# Patient Record
Sex: MALE | Race: WHITE | NOT HISPANIC OR LATINO | Employment: FULL TIME | URBAN - METROPOLITAN AREA
[De-identification: names, ages, dates, MRNs, and addresses within clinical notes are randomized per-mention and may not be internally consistent; named-entity substitution may affect disease eponyms.]

---

## 2020-06-09 ENCOUNTER — OFFICE VISIT (OUTPATIENT)
Dept: INTERNAL MEDICINE | Facility: CLINIC | Age: 30
End: 2020-06-09
Payer: COMMERCIAL

## 2020-06-09 VITALS
HEIGHT: 67 IN | WEIGHT: 183 LBS | SYSTOLIC BLOOD PRESSURE: 118 MMHG | DIASTOLIC BLOOD PRESSURE: 76 MMHG | BODY MASS INDEX: 28.72 KG/M2

## 2020-06-09 DIAGNOSIS — K58.9 IRRITABLE BOWEL SYNDROME, UNSPECIFIED TYPE: ICD-10-CM

## 2020-06-09 DIAGNOSIS — G43.909 MIGRAINE WITHOUT STATUS MIGRAINOSUS, NOT INTRACTABLE, UNSPECIFIED MIGRAINE TYPE: ICD-10-CM

## 2020-06-09 DIAGNOSIS — Z00.00 ANNUAL PHYSICAL EXAM: Primary | ICD-10-CM

## 2020-06-09 PROCEDURE — 99999 PR PBB SHADOW E&M-EST. PATIENT-LVL III: ICD-10-PCS | Mod: PBBFAC,,, | Performed by: INTERNAL MEDICINE

## 2020-06-09 PROCEDURE — 99385 PR PREVENTIVE VISIT,NEW,18-39: ICD-10-PCS | Mod: S$GLB,,, | Performed by: INTERNAL MEDICINE

## 2020-06-09 PROCEDURE — 99385 PREV VISIT NEW AGE 18-39: CPT | Mod: S$GLB,,, | Performed by: INTERNAL MEDICINE

## 2020-06-09 PROCEDURE — 99999 PR PBB SHADOW E&M-EST. PATIENT-LVL III: CPT | Mod: PBBFAC,,, | Performed by: INTERNAL MEDICINE

## 2020-06-09 RX ORDER — SUMATRIPTAN 50 MG/1
50 TABLET, FILM COATED ORAL
Qty: 20 TABLET | Refills: 1 | Status: SHIPPED | OUTPATIENT
Start: 2020-06-09 | End: 2020-10-01 | Stop reason: SDUPTHER

## 2020-06-09 NOTE — PROGRESS NOTES
"    CHIEF COMPLAINT     Chief Complaint   Patient presents with    Establish Care     is establishing care as a new patient. he is from [place].    Migraine     has a long hx of, migraines are less frequent now than normally. migraines are pretty extreme when they occur. patient takes Tylenol Migraine to help relieve. other sx like pain in MARK eyes, nausea/vomiting and facial pain.    Nasal Congestion     has nasal and chest congestion, lasts all throughout the summer.        HPI     Germain Parrish is a 30 y.o. male here today for     Migraine  Dx as teen. Reports getting 1-2x month. Sx facial pressure, photo/phonophobia, nauseated.  Episodes last 3-12hrs. Reports can break 50% of time with NSAIDS and caffeine. Less frequent over the preceding months. Triggers lighting, certain food triggers.      Anxiety  Reports an issue since 2nd child approximately 1 year. Reports difficulty withholding reactions. Increased irritability. Went to therapist last week.       Sensitive stomach  Reports BM frequently. Sx include bloating  Reports symptoms improve after BM.   Has tried cutting out dairy with some improvement.   No blood in stool, no weight loss.    Personally Reviewed Patient's Medical, surgical, family and social hx. Changes updated in [employer].  Care Team updated in Epic    Review of Systems:  Review of Systems   Constitutional: Negative for fever and unexpected weight change.   HENT: Positive for congestion.    Gastrointestinal: Positive for diarrhea.        Bloating   Musculoskeletal: Positive for back pain.   Neurological: Negative for headaches.   Psychiatric/Behavioral: The patient is nervous/anxious.        Health Maintenance:   Reviewed with patient  Due for the following:      PHYSICAL EXAM     /76 (BP Location: Left arm, Patient Position: Sitting, BP Method: Large (Manual))   Ht 5' 7" (1.702 m)   Wt 83 kg (183 lb)   BMI 28.66 kg/m²     Gen: Well Appearing, NAD  HEENT: PERR, EOMI  Neck: FROM, no " thyromegaly, no cervical adenopathy  CVD: RRR, no M/R/G  Pulm: Normal work of breathing, CTAB, no wheezing  Abd:  Soft, NT, ND non TTP, no mass  MSK: no LE edema  Neuro: A&Ox3, gait normal, speech normal  Mood; Mood normal, behavior normal, thought process linear       LABS     Labs reviewed; Notable for    ASSESSMENT     1. Annual physical exam  CBC Without Differential    Comprehensive metabolic panel    Lipid Panel   2. Migraine without status migrainosus, not intractable, unspecified migraine type  sumatriptan (IMITREX) 50 MG tablet   3. Irritable bowel syndrome, unspecified type             Plan     Germain Parrish is a 30 y.o. male with  1. Annual physical exam  Updated problem list, medical history, care team and discussed HM.   - CBC Without Differential; Future  - Comprehensive metabolic panel; Future  - Lipid Panel; Future    2. Migraine without status migrainosus, not intractable, unspecified migraine type  Symptoms consistent with migraine without aura.  Headaches not happening frequently enough to require prophylactic treatment  Discussed lifestyle optimization for headache  Will trial as Imitrex to see you if it works as an abortive agent.    3. Irritable Bowel Syndrome  As patient to use bowelle clarence to track symptoms  Will reassess in 3 months and consider secondary workup if necessary.  Ramirez Edwards MD

## 2020-06-13 ENCOUNTER — LAB VISIT (OUTPATIENT)
Dept: LAB | Facility: HOSPITAL | Age: 30
End: 2020-06-13
Attending: INTERNAL MEDICINE
Payer: COMMERCIAL

## 2020-06-13 DIAGNOSIS — Z00.00 ANNUAL PHYSICAL EXAM: ICD-10-CM

## 2020-06-13 LAB
ALBUMIN SERPL BCP-MCNC: 4.3 G/DL (ref 3.5–5.2)
ALP SERPL-CCNC: 56 U/L (ref 55–135)
ALT SERPL W/O P-5'-P-CCNC: 16 U/L (ref 10–44)
ANION GAP SERPL CALC-SCNC: 9 MMOL/L (ref 8–16)
AST SERPL-CCNC: 19 U/L (ref 10–40)
BILIRUB SERPL-MCNC: 0.7 MG/DL (ref 0.1–1)
BUN SERPL-MCNC: 17 MG/DL (ref 6–20)
CALCIUM SERPL-MCNC: 9.6 MG/DL (ref 8.7–10.5)
CHLORIDE SERPL-SCNC: 103 MMOL/L (ref 95–110)
CHOLEST SERPL-MCNC: 176 MG/DL (ref 120–199)
CHOLEST/HDLC SERPL: 6.3 {RATIO} (ref 2–5)
CO2 SERPL-SCNC: 27 MMOL/L (ref 23–29)
CREAT SERPL-MCNC: 1.1 MG/DL (ref 0.5–1.4)
ERYTHROCYTE [DISTWIDTH] IN BLOOD BY AUTOMATED COUNT: 12.8 % (ref 11.5–14.5)
EST. GFR  (AFRICAN AMERICAN): >60 ML/MIN/1.73 M^2
EST. GFR  (NON AFRICAN AMERICAN): >60 ML/MIN/1.73 M^2
GLUCOSE SERPL-MCNC: 85 MG/DL (ref 70–110)
HCT VFR BLD AUTO: 42.2 % (ref 40–54)
HDLC SERPL-MCNC: 28 MG/DL (ref 40–75)
HDLC SERPL: 15.9 % (ref 20–50)
HGB BLD-MCNC: 14 G/DL (ref 14–18)
LDLC SERPL CALC-MCNC: 108 MG/DL (ref 63–159)
MCH RBC QN AUTO: 28.5 PG (ref 27–31)
MCHC RBC AUTO-ENTMCNC: 33.2 G/DL (ref 32–36)
MCV RBC AUTO: 86 FL (ref 82–98)
NONHDLC SERPL-MCNC: 148 MG/DL
PLATELET # BLD AUTO: 253 K/UL (ref 150–350)
PMV BLD AUTO: 10.3 FL (ref 9.2–12.9)
POTASSIUM SERPL-SCNC: 4.1 MMOL/L (ref 3.5–5.1)
PROT SERPL-MCNC: 8 G/DL (ref 6–8.4)
RBC # BLD AUTO: 4.92 M/UL (ref 4.6–6.2)
SODIUM SERPL-SCNC: 139 MMOL/L (ref 136–145)
TRIGL SERPL-MCNC: 200 MG/DL (ref 30–150)
WBC # BLD AUTO: 6.87 K/UL (ref 3.9–12.7)

## 2020-06-13 PROCEDURE — 80053 COMPREHEN METABOLIC PANEL: CPT

## 2020-06-13 PROCEDURE — 36415 COLL VENOUS BLD VENIPUNCTURE: CPT

## 2020-06-13 PROCEDURE — 80061 LIPID PANEL: CPT

## 2020-06-13 PROCEDURE — 85027 COMPLETE CBC AUTOMATED: CPT

## 2020-06-15 ENCOUNTER — TELEPHONE (OUTPATIENT)
Dept: INTERNAL MEDICINE | Facility: CLINIC | Age: 30
End: 2020-06-15

## 2020-06-15 NOTE — TELEPHONE ENCOUNTER
----- Message from Ramirez Edwards MD sent at 6/15/2020  7:48 AM CDT -----  I have reviewed your labs. Results are for the most part normal not requiring any immediate follow up. I have highlighted a couple of abnormal results that require further attention.      Please reach out to me if you have any further questions or concerns.    Ramirez Edwards      Work on maintaining normal body weight  Increase physical activity  Reduce added sugar and refined carbs in diet  Get 8 hours of sleep nightly

## 2020-10-01 ENCOUNTER — OFFICE VISIT (OUTPATIENT)
Dept: INTERNAL MEDICINE | Facility: CLINIC | Age: 30
End: 2020-10-01
Payer: COMMERCIAL

## 2020-10-01 ENCOUNTER — TELEPHONE (OUTPATIENT)
Dept: INTERNAL MEDICINE | Facility: CLINIC | Age: 30
End: 2020-10-01

## 2020-10-01 VITALS
SYSTOLIC BLOOD PRESSURE: 116 MMHG | DIASTOLIC BLOOD PRESSURE: 82 MMHG | BODY MASS INDEX: 28.72 KG/M2 | WEIGHT: 183 LBS | HEIGHT: 67 IN

## 2020-10-01 DIAGNOSIS — G43.909 MIGRAINE WITHOUT STATUS MIGRAINOSUS, NOT INTRACTABLE, UNSPECIFIED MIGRAINE TYPE: Primary | ICD-10-CM

## 2020-10-01 DIAGNOSIS — K58.9 IRRITABLE BOWEL SYNDROME, UNSPECIFIED TYPE: ICD-10-CM

## 2020-10-01 DIAGNOSIS — R09.82 POST-NASAL DRIP: ICD-10-CM

## 2020-10-01 PROCEDURE — 3008F BODY MASS INDEX DOCD: CPT | Mod: CPTII,S$GLB,, | Performed by: INTERNAL MEDICINE

## 2020-10-01 PROCEDURE — 99999 PR PBB SHADOW E&M-EST. PATIENT-LVL III: ICD-10-PCS | Mod: PBBFAC,,, | Performed by: INTERNAL MEDICINE

## 2020-10-01 PROCEDURE — 99214 PR OFFICE/OUTPT VISIT, EST, LEVL IV, 30-39 MIN: ICD-10-PCS | Mod: S$GLB,,, | Performed by: INTERNAL MEDICINE

## 2020-10-01 PROCEDURE — 3008F PR BODY MASS INDEX (BMI) DOCUMENTED: ICD-10-PCS | Mod: CPTII,S$GLB,, | Performed by: INTERNAL MEDICINE

## 2020-10-01 PROCEDURE — 99214 OFFICE O/P EST MOD 30 MIN: CPT | Mod: S$GLB,,, | Performed by: INTERNAL MEDICINE

## 2020-10-01 PROCEDURE — 99999 PR PBB SHADOW E&M-EST. PATIENT-LVL III: CPT | Mod: PBBFAC,,, | Performed by: INTERNAL MEDICINE

## 2020-10-01 RX ORDER — SUMATRIPTAN 50 MG/1
50 TABLET, FILM COATED ORAL
Qty: 20 TABLET | Refills: 1 | Status: SHIPPED | OUTPATIENT
Start: 2020-10-01 | End: 2023-01-12

## 2020-10-01 RX ORDER — SERTRALINE HYDROCHLORIDE 100 MG/1
TABLET, FILM COATED ORAL
COMMUNITY
Start: 2020-09-21 | End: 2022-03-17 | Stop reason: SDUPTHER

## 2020-10-01 NOTE — TELEPHONE ENCOUNTER
Called, spoke to patient's pharmacy.   Pharmacy stated that in regards to insurance, once he put it in, medication was approved and pharmacist was able to get coupon for $10.

## 2020-10-01 NOTE — PROGRESS NOTES
"    CHIEF COMPLAINT     Chief Complaint   Patient presents with    Follow-up     3 month F/U. started on Mon, patient reports of sharp-shooting pain on R great toe on R foot and on metatarsals on R hand.     Dizziness     reports of consistent dizzy spells, ongoing from Mon to Wed of last week.    Sore Throat     reports of having a sore throat on this am, stated that he had taken DayQuil to help subside. denied fevers, SOB or diarrhea.       HPI     Germain Parrish is a 30 y.o. male here today for     HA  Reports having a bad 3 day run over the past 6 weeks. Reports similar to previous headaches. Tried imitrex x2 and was unable to break.   Was not taking ibuprofen with Imitrex.    Disequilibrium  Reports having 3 episodes of disequilibrium last week.  Reports that he was doing work around the house the day before and then felt like he was a little bit off balance.  Symptoms were exacerbated by change in position.  Associated symptoms include little bit of nasal congestion.  Symptoms resolved on their own after Wednesday.  No longer having any symptoms.    Postnasal drip  Reports having cough sore throat.  Has been going on for the past 2 weeks.  Has really bad seasonal allergies this time of year.  Reports that he also took some DayQuil which help with some of the symptoms today.  Denies fever, lymphadenopathy    Personally Reviewed Patient's Medical, surgical, family and social hx. Changes updated in [facility].  Care Team updated in Epic    Review of Systems:  Review of Systems   Constitutional: Negative for unexpected weight change.   HENT: Positive for postnasal drip and sore throat.    Respiratory: Positive for cough.    Gastrointestinal: Negative for diarrhea.   Neurological: Positive for headaches.       Health Maintenance:   Reviewed with patient  Due for the following:      PHYSICAL EXAM     /82 (BP Location: Left arm, Patient Position: Sitting, BP Method: Large (Manual))   Ht 5' 7" (1.702 m)   Wt " 83 kg (183 lb)   BMI 28.66 kg/m²     Gen: Well Appearing, NAD  HEENT: PERR, EOMI, clear TM bilaterally enlarged bilateral turbinate, copious rhinorrhea, mucus refer posterior pharynx, no tonsillar exudate or enlargement  Neck: FROM, no thyromegaly, no cervical adenopathy  CVD: RRR, no M/R/G  Pulm: Normal work of breathing, CTAB, no wheezing  Abd:  Soft, NT, ND non TTP, no mass  MSK: no LE edema  Neuro: A&Ox3, gait normal, speech normal  Mood; Mood normal, behavior normal, thought process linear       LABS     Labs reviewed; Notable for    ASSESSMENT     1. Migraine without status migrainosus, not intractable, unspecified migraine type  ubrogepant (UBRELVY)tablet 100 mg    sumatriptan (IMITREX) 50 MG tablet   2. Irritable bowel syndrome, unspecified type     3. Post-nasal drip             Plan     Germain Parrish is a 30 y.o. male with  1. Migraine without status migrainosus, not intractable, unspecified migraine type  Did not respond as well to Imitrex as we had hoped.  Will try to get him prior authorization to try Ubrelvy.  In the interim will for refilled the Imitrex and have him take with ibuprofen 1st dose.  I asked to track headache frequency and triggers over the next few months  - ubrogepant (UBRELVY)tablet 100 mg; Take 1 tablet (100 mg total) by mouth daily as needed for Migraine.  Dispense: 8 tablet; Refill: 1  - sumatriptan (IMITREX) 50 MG tablet; Take 1 tablet (50 mg total) by mouth every 2 (two) hours as needed for Migraine (Do not exceed 2 doses in 24 hour period).  Dispense: 20 tablet; Refill: 1    2. Irritable bowel syndrome, unspecified type  Improved with avoidance of dairy.    3. Post nasal drip  New problem  Recommend trial of over-the-counter nasal steroid and oral antihistamine.    Ramirez Edwards MD

## 2020-10-01 NOTE — TELEPHONE ENCOUNTER
Called and spoke to patient's pharmacy.  Pharmacy is stating that for the Ubrelvy 100 MG dose tablets. She states that the medicine normally comes in a 10 pack since its so costly. pharmacist was asking if they could possibly change the quantity from 8 to 10 tablets?    Please advise.

## 2020-10-01 NOTE — TELEPHONE ENCOUNTER
----- Message from Lucretia Black sent at 10/1/2020  2:19 PM CDT -----  Contact: [facility] [phone]  Prescription Clarification:     The pharmacy needs clarity on the following Rx:    ubrogepant (UBRELVY)tablet 100 mg    Stated that it comes in a pack of 10 tablets and they do not break it. Please change quantity from 8 to 10 tablets    Pharmacy: [facility] ([facility]) - [address]    Thank You

## 2020-11-02 ENCOUNTER — TELEPHONE (OUTPATIENT)
Dept: INTERNAL MEDICINE | Facility: CLINIC | Age: 30
End: 2020-11-02

## 2020-11-02 NOTE — TELEPHONE ENCOUNTER
----- Message from Lucretia Black sent at 11/2/2020 10:48 AM CST -----  Contact: [facility] [phone] REF#[id number]  Calling to check the status of the PA needed for Rx ubrogepant (UBRELVY)tablet 100 mg    Please call and advise.    Thank You

## 2021-03-20 ENCOUNTER — IMMUNIZATION (OUTPATIENT)
Dept: PRIMARY CARE CLINIC | Facility: CLINIC | Age: 31
End: 2021-03-20
Payer: COMMERCIAL

## 2021-03-20 DIAGNOSIS — Z23 NEED FOR VACCINATION: Primary | ICD-10-CM

## 2021-03-20 PROCEDURE — 0001A PR IMMUNIZ ADMIN, SARS-COV-2 COVID-19 VACC, 30MCG/0.3ML, 1ST DOSE: ICD-10-PCS | Mod: CV19,S$GLB,, | Performed by: INTERNAL MEDICINE

## 2021-03-20 PROCEDURE — 91300 PR SARS-COV- 2 COVID-19 VACCINE, NO PRSV, 30MCG/0.3ML, IM: ICD-10-PCS | Mod: S$GLB,,, | Performed by: INTERNAL MEDICINE

## 2021-03-20 PROCEDURE — 0001A PR IMMUNIZ ADMIN, SARS-COV-2 COVID-19 VACC, 30MCG/0.3ML, 1ST DOSE: CPT | Mod: CV19,S$GLB,, | Performed by: INTERNAL MEDICINE

## 2021-03-20 PROCEDURE — 91300 PR SARS-COV- 2 COVID-19 VACCINE, NO PRSV, 30MCG/0.3ML, IM: CPT | Mod: S$GLB,,, | Performed by: INTERNAL MEDICINE

## 2021-03-20 RX ADMIN — Medication 0.3 ML: at 04:03

## 2021-04-11 ENCOUNTER — IMMUNIZATION (OUTPATIENT)
Dept: PRIMARY CARE CLINIC | Facility: CLINIC | Age: 31
End: 2021-04-11
Payer: COMMERCIAL

## 2021-04-11 DIAGNOSIS — Z23 NEED FOR VACCINATION: Primary | ICD-10-CM

## 2021-04-11 PROCEDURE — 0002A PR IMMUNIZ ADMIN, SARS-COV-2 COVID-19 VACC, 30MCG/0.3ML, 2ND DOSE: CPT | Mod: CV19,S$GLB,, | Performed by: INTERNAL MEDICINE

## 2021-04-11 PROCEDURE — 0002A PR IMMUNIZ ADMIN, SARS-COV-2 COVID-19 VACC, 30MCG/0.3ML, 2ND DOSE: ICD-10-PCS | Mod: CV19,S$GLB,, | Performed by: INTERNAL MEDICINE

## 2021-04-11 PROCEDURE — 91300 PR SARS-COV- 2 COVID-19 VACCINE, NO PRSV, 30MCG/0.3ML, IM: CPT | Mod: S$GLB,,, | Performed by: INTERNAL MEDICINE

## 2021-04-11 PROCEDURE — 91300 PR SARS-COV- 2 COVID-19 VACCINE, NO PRSV, 30MCG/0.3ML, IM: ICD-10-PCS | Mod: S$GLB,,, | Performed by: INTERNAL MEDICINE

## 2021-04-11 RX ADMIN — Medication 0.3 ML: at 04:04

## 2021-07-14 ENCOUNTER — OFFICE VISIT (OUTPATIENT)
Dept: INTERNAL MEDICINE | Facility: CLINIC | Age: 31
End: 2021-07-14
Payer: COMMERCIAL

## 2021-07-14 VITALS
DIASTOLIC BLOOD PRESSURE: 88 MMHG | HEART RATE: 87 BPM | BODY MASS INDEX: 30.07 KG/M2 | OXYGEN SATURATION: 98 % | WEIGHT: 191.56 LBS | HEIGHT: 67 IN | SYSTOLIC BLOOD PRESSURE: 120 MMHG

## 2021-07-14 DIAGNOSIS — R05.9 COUGH: Primary | ICD-10-CM

## 2021-07-14 DIAGNOSIS — J06.9 VIRAL URI: ICD-10-CM

## 2021-07-14 PROCEDURE — 99999 PR PBB SHADOW E&M-EST. PATIENT-LVL III: ICD-10-PCS | Mod: PBBFAC,,, | Performed by: INTERNAL MEDICINE

## 2021-07-14 PROCEDURE — U0003 INFECTIOUS AGENT DETECTION BY NUCLEIC ACID (DNA OR RNA); SEVERE ACUTE RESPIRATORY SYNDROME CORONAVIRUS 2 (SARS-COV-2) (CORONAVIRUS DISEASE [COVID-19]), AMPLIFIED PROBE TECHNIQUE, MAKING USE OF HIGH THROUGHPUT TECHNOLOGIES AS DESCRIBED BY CMS-2020-01-R: HCPCS | Performed by: INTERNAL MEDICINE

## 2021-07-14 PROCEDURE — 99213 OFFICE O/P EST LOW 20 MIN: CPT | Mod: S$GLB,,, | Performed by: INTERNAL MEDICINE

## 2021-07-14 PROCEDURE — 99213 PR OFFICE/OUTPT VISIT, EST, LEVL III, 20-29 MIN: ICD-10-PCS | Mod: S$GLB,,, | Performed by: INTERNAL MEDICINE

## 2021-07-14 PROCEDURE — U0005 INFEC AGEN DETEC AMPLI PROBE: HCPCS | Performed by: INTERNAL MEDICINE

## 2021-07-14 PROCEDURE — 99999 PR PBB SHADOW E&M-EST. PATIENT-LVL III: CPT | Mod: PBBFAC,,, | Performed by: INTERNAL MEDICINE

## 2021-07-15 LAB
SARS-COV-2 RNA RESP QL NAA+PROBE: NOT DETECTED
SARS-COV-2- CYCLE NUMBER: -1

## 2021-12-21 ENCOUNTER — OFFICE VISIT (OUTPATIENT)
Dept: PRIMARY CARE CLINIC | Facility: CLINIC | Age: 31
End: 2021-12-21
Payer: COMMERCIAL

## 2021-12-21 ENCOUNTER — PATIENT MESSAGE (OUTPATIENT)
Dept: PRIMARY CARE CLINIC | Facility: CLINIC | Age: 31
End: 2021-12-21

## 2021-12-21 DIAGNOSIS — J06.9 UPPER RESPIRATORY TRACT INFECTION, UNSPECIFIED TYPE: Primary | ICD-10-CM

## 2021-12-21 PROCEDURE — 99214 OFFICE O/P EST MOD 30 MIN: CPT | Mod: 95,,, | Performed by: STUDENT IN AN ORGANIZED HEALTH CARE EDUCATION/TRAINING PROGRAM

## 2021-12-21 PROCEDURE — 99214 PR OFFICE/OUTPT VISIT, EST, LEVL IV, 30-39 MIN: ICD-10-PCS | Mod: 95,,, | Performed by: STUDENT IN AN ORGANIZED HEALTH CARE EDUCATION/TRAINING PROGRAM

## 2021-12-21 RX ORDER — AZITHROMYCIN 250 MG/1
TABLET, FILM COATED ORAL
Qty: 6 TABLET | Refills: 0 | Status: SHIPPED | OUTPATIENT
Start: 2021-12-21 | End: 2021-12-21

## 2021-12-21 RX ORDER — PREDNISONE 20 MG/1
TABLET ORAL
Qty: 5 TABLET | Refills: 0 | Status: SHIPPED | OUTPATIENT
Start: 2021-12-21 | End: 2022-02-21

## 2021-12-21 RX ORDER — AZITHROMYCIN 250 MG/1
TABLET, FILM COATED ORAL
Qty: 6 TABLET | Refills: 0 | Status: SHIPPED | OUTPATIENT
Start: 2021-12-21 | End: 2021-12-26

## 2021-12-21 RX ORDER — PROMETHAZINE HYDROCHLORIDE AND CODEINE PHOSPHATE 6.25; 1 MG/5ML; MG/5ML
5 SOLUTION ORAL EVERY 6 HOURS PRN
Qty: 120 ML | Refills: 0 | Status: SHIPPED | OUTPATIENT
Start: 2021-12-21 | End: 2021-12-21

## 2021-12-21 RX ORDER — PROMETHAZINE HYDROCHLORIDE AND CODEINE PHOSPHATE 6.25; 1 MG/5ML; MG/5ML
5 SOLUTION ORAL EVERY 6 HOURS PRN
Qty: 120 ML | Refills: 0 | Status: SHIPPED | OUTPATIENT
Start: 2021-12-21 | End: 2022-02-21

## 2021-12-21 RX ORDER — PREDNISONE 20 MG/1
TABLET ORAL
Qty: 5 TABLET | Refills: 0 | Status: SHIPPED | OUTPATIENT
Start: 2021-12-21 | End: 2021-12-21

## 2021-12-22 ENCOUNTER — NURSE TRIAGE (OUTPATIENT)
Dept: ADMINISTRATIVE | Facility: CLINIC | Age: 31
End: 2021-12-22
Payer: COMMERCIAL

## 2021-12-22 ENCOUNTER — HOSPITAL ENCOUNTER (EMERGENCY)
Facility: HOSPITAL | Age: 31
Discharge: HOME | End: 2021-12-22
Attending: EMERGENCY MEDICINE
Payer: COMMERCIAL

## 2021-12-22 VITALS
SYSTOLIC BLOOD PRESSURE: 176 MMHG | OXYGEN SATURATION: 99 % | WEIGHT: 180 LBS | RESPIRATION RATE: 16 BRPM | BODY MASS INDEX: 28.25 KG/M2 | TEMPERATURE: 99 F | HEIGHT: 67 IN | DIASTOLIC BLOOD PRESSURE: 99 MMHG | HEART RATE: 88 BPM

## 2021-12-22 DIAGNOSIS — N23 RENAL COLIC ON RIGHT SIDE: ICD-10-CM

## 2021-12-22 DIAGNOSIS — R03.0 ELEVATED BLOOD PRESSURE READING: ICD-10-CM

## 2021-12-22 DIAGNOSIS — J18.9 PNEUMONIA OF LEFT LOWER LOBE DUE TO INFECTIOUS ORGANISM: ICD-10-CM

## 2021-12-22 DIAGNOSIS — Z20.822 LAB TEST NEGATIVE FOR COVID-19 VIRUS: ICD-10-CM

## 2021-12-22 DIAGNOSIS — N20.0 KIDNEY STONE: Primary | ICD-10-CM

## 2021-12-22 LAB
ALBUMIN SERPL BCP-MCNC: 4.3 G/DL (ref 3.5–5.2)
ALP SERPL-CCNC: 69 U/L (ref 55–135)
ALT SERPL W/O P-5'-P-CCNC: 18 U/L (ref 10–44)
ANION GAP SERPL CALC-SCNC: 13 MMOL/L (ref 8–16)
AST SERPL-CCNC: 21 U/L (ref 10–40)
BACTERIA #/AREA URNS AUTO: ABNORMAL /HPF
BASOPHILS # BLD AUTO: 0.03 K/UL (ref 0–0.2)
BASOPHILS NFR BLD: 0.2 % (ref 0–1.9)
BILIRUB SERPL-MCNC: 0.5 MG/DL (ref 0.1–1)
BILIRUB UR QL STRIP: NEGATIVE
BUN SERPL-MCNC: 15 MG/DL (ref 6–20)
CALCIUM SERPL-MCNC: 9.4 MG/DL (ref 8.7–10.5)
CHLORIDE SERPL-SCNC: 103 MMOL/L (ref 95–110)
CLARITY UR REFRACT.AUTO: ABNORMAL
CO2 SERPL-SCNC: 23 MMOL/L (ref 23–29)
COLOR UR AUTO: ABNORMAL
CREAT SERPL-MCNC: 1.3 MG/DL (ref 0.5–1.4)
CTP QC/QA: YES
CTP QC/QA: YES
DIFFERENTIAL METHOD: ABNORMAL
EOSINOPHIL # BLD AUTO: 0 K/UL (ref 0–0.5)
EOSINOPHIL NFR BLD: 0.1 % (ref 0–8)
ERYTHROCYTE [DISTWIDTH] IN BLOOD BY AUTOMATED COUNT: 13 % (ref 11.5–14.5)
EST. GFR  (AFRICAN AMERICAN): >60 ML/MIN/1.73 M^2
EST. GFR  (NON AFRICAN AMERICAN): >60 ML/MIN/1.73 M^2
GLUCOSE SERPL-MCNC: 106 MG/DL (ref 70–110)
GLUCOSE UR QL STRIP: NEGATIVE
HCT VFR BLD AUTO: 41.8 % (ref 40–54)
HGB BLD-MCNC: 14.5 G/DL (ref 14–18)
HGB UR QL STRIP: ABNORMAL
HYALINE CASTS UR QL AUTO: 7 /LPF
IMM GRANULOCYTES # BLD AUTO: 0.06 K/UL (ref 0–0.04)
IMM GRANULOCYTES NFR BLD AUTO: 0.5 % (ref 0–0.5)
KETONES UR QL STRIP: ABNORMAL
LEUKOCYTE ESTERASE UR QL STRIP: NEGATIVE
LIPASE SERPL-CCNC: 18 U/L (ref 4–60)
LYMPHOCYTES # BLD AUTO: 2 K/UL (ref 1–4.8)
LYMPHOCYTES NFR BLD: 15.8 % (ref 18–48)
MCH RBC QN AUTO: 28.5 PG (ref 27–31)
MCHC RBC AUTO-ENTMCNC: 34.7 G/DL (ref 32–36)
MCV RBC AUTO: 82 FL (ref 82–98)
MICROSCOPIC COMMENT: ABNORMAL
MONOCYTES # BLD AUTO: 0.6 K/UL (ref 0.3–1)
MONOCYTES NFR BLD: 4.9 % (ref 4–15)
NEUTROPHILS # BLD AUTO: 10.1 K/UL (ref 1.8–7.7)
NEUTROPHILS NFR BLD: 78.5 % (ref 38–73)
NITRITE UR QL STRIP: NEGATIVE
NRBC BLD-RTO: 0 /100 WBC
PH UR STRIP: 5 [PH] (ref 5–8)
PLATELET # BLD AUTO: 265 K/UL (ref 150–450)
PMV BLD AUTO: 10.2 FL (ref 9.2–12.9)
POC MOLECULAR INFLUENZA A AGN: NEGATIVE
POC MOLECULAR INFLUENZA B AGN: NEGATIVE
POTASSIUM SERPL-SCNC: 3.7 MMOL/L (ref 3.5–5.1)
PROT SERPL-MCNC: 8.3 G/DL (ref 6–8.4)
PROT UR QL STRIP: ABNORMAL
RBC # BLD AUTO: 5.08 M/UL (ref 4.6–6.2)
RBC #/AREA URNS AUTO: >100 /HPF (ref 0–4)
SARS-COV-2 RDRP RESP QL NAA+PROBE: NEGATIVE
SODIUM SERPL-SCNC: 139 MMOL/L (ref 136–145)
SP GR UR STRIP: >=1.03 (ref 1–1.03)
URN SPEC COLLECT METH UR: ABNORMAL
WBC # BLD AUTO: 12.91 K/UL (ref 3.9–12.7)
WBC #/AREA URNS AUTO: 1 /HPF (ref 0–5)

## 2021-12-22 PROCEDURE — 99285 EMERGENCY DEPT VISIT HI MDM: CPT | Mod: 25

## 2021-12-22 PROCEDURE — 63600175 PHARM REV CODE 636 W HCPCS: Performed by: EMERGENCY MEDICINE

## 2021-12-22 PROCEDURE — 81001 URINALYSIS AUTO W/SCOPE: CPT | Performed by: EMERGENCY MEDICINE

## 2021-12-22 PROCEDURE — 99285 PR EMERGENCY DEPT VISIT,LEVEL V: ICD-10-PCS | Mod: ,,, | Performed by: EMERGENCY MEDICINE

## 2021-12-22 PROCEDURE — 96374 THER/PROPH/DIAG INJ IV PUSH: CPT

## 2021-12-22 PROCEDURE — 80053 COMPREHEN METABOLIC PANEL: CPT | Performed by: PHYSICIAN ASSISTANT

## 2021-12-22 PROCEDURE — 87502 INFLUENZA DNA AMP PROBE: CPT

## 2021-12-22 PROCEDURE — 25000003 PHARM REV CODE 250: Performed by: PHYSICIAN ASSISTANT

## 2021-12-22 PROCEDURE — U0002 COVID-19 LAB TEST NON-CDC: HCPCS | Performed by: EMERGENCY MEDICINE

## 2021-12-22 PROCEDURE — 85025 COMPLETE CBC W/AUTO DIFF WBC: CPT | Performed by: PHYSICIAN ASSISTANT

## 2021-12-22 PROCEDURE — 99285 EMERGENCY DEPT VISIT HI MDM: CPT | Mod: ,,, | Performed by: EMERGENCY MEDICINE

## 2021-12-22 PROCEDURE — 83690 ASSAY OF LIPASE: CPT | Performed by: PHYSICIAN ASSISTANT

## 2021-12-22 RX ORDER — TAMSULOSIN HYDROCHLORIDE 0.4 MG/1
0.4 CAPSULE ORAL
Status: COMPLETED | OUTPATIENT
Start: 2021-12-22 | End: 2021-12-22

## 2021-12-22 RX ORDER — MORPHINE SULFATE 4 MG/ML
INJECTION, SOLUTION INTRAMUSCULAR; INTRAVENOUS
Status: DISCONTINUED
Start: 2021-12-22 | End: 2021-12-22 | Stop reason: HOSPADM

## 2021-12-22 RX ORDER — KETOROLAC TROMETHAMINE 10 MG/1
10 TABLET, FILM COATED ORAL EVERY 12 HOURS PRN
Qty: 10 TABLET | Refills: 0 | Status: SHIPPED | OUTPATIENT
Start: 2021-12-22 | End: 2021-12-27

## 2021-12-22 RX ORDER — PROMETHAZINE HYDROCHLORIDE 25 MG/1
25 TABLET ORAL 3 TIMES DAILY PRN
Qty: 12 TABLET | Refills: 0 | Status: SHIPPED | OUTPATIENT
Start: 2021-12-22 | End: 2021-12-26

## 2021-12-22 RX ORDER — TAMSULOSIN HYDROCHLORIDE 0.4 MG/1
0.4 CAPSULE ORAL DAILY
Qty: 30 CAPSULE | Refills: 0 | Status: SHIPPED | OUTPATIENT
Start: 2021-12-22 | End: 2021-12-29

## 2021-12-22 RX ORDER — KETOROLAC TROMETHAMINE 10 MG/1
10 TABLET, FILM COATED ORAL
Status: COMPLETED | OUTPATIENT
Start: 2021-12-22 | End: 2021-12-22

## 2021-12-22 RX ORDER — MORPHINE SULFATE 4 MG/ML
4 INJECTION, SOLUTION INTRAMUSCULAR; INTRAVENOUS
Status: COMPLETED | OUTPATIENT
Start: 2021-12-22 | End: 2021-12-22

## 2021-12-22 RX ORDER — ONDANSETRON 4 MG/1
4 TABLET, ORALLY DISINTEGRATING ORAL
Status: COMPLETED | OUTPATIENT
Start: 2021-12-22 | End: 2021-12-22

## 2021-12-22 RX ADMIN — TAMSULOSIN HYDROCHLORIDE 0.4 MG: 0.4 CAPSULE ORAL at 08:12

## 2021-12-22 RX ADMIN — MORPHINE SULFATE 4 MG: 4 INJECTION INTRAVENOUS at 08:12

## 2021-12-22 RX ADMIN — ONDANSETRON 4 MG: 4 TABLET, ORALLY DISINTEGRATING ORAL at 07:12

## 2021-12-22 RX ADMIN — KETOROLAC TROMETHAMINE 10 MG: 10 TABLET, FILM COATED ORAL at 07:12

## 2021-12-23 ENCOUNTER — PATIENT OUTREACH (OUTPATIENT)
Dept: ADMINISTRATIVE | Facility: OTHER | Age: 31
End: 2021-12-23
Payer: COMMERCIAL

## 2021-12-29 ENCOUNTER — OFFICE VISIT (OUTPATIENT)
Dept: UROLOGY | Facility: CLINIC | Age: 31
End: 2021-12-29
Payer: COMMERCIAL

## 2021-12-29 DIAGNOSIS — N20.0 KIDNEY STONE: ICD-10-CM

## 2021-12-29 DIAGNOSIS — N23 RENAL COLIC ON RIGHT SIDE: ICD-10-CM

## 2021-12-29 PROCEDURE — 99999 PR PBB SHADOW E&M-EST. PATIENT-LVL III: CPT | Mod: PBBFAC,,, | Performed by: UROLOGY

## 2021-12-29 PROCEDURE — 99204 OFFICE O/P NEW MOD 45 MIN: CPT | Mod: S$GLB,,, | Performed by: UROLOGY

## 2021-12-29 PROCEDURE — 99204 PR OFFICE/OUTPT VISIT, NEW, LEVL IV, 45-59 MIN: ICD-10-PCS | Mod: S$GLB,,, | Performed by: UROLOGY

## 2021-12-29 PROCEDURE — 82365 CALCULUS SPECTROSCOPY: CPT | Performed by: STUDENT IN AN ORGANIZED HEALTH CARE EDUCATION/TRAINING PROGRAM

## 2021-12-29 PROCEDURE — 99999 PR PBB SHADOW E&M-EST. PATIENT-LVL III: ICD-10-PCS | Mod: PBBFAC,,, | Performed by: UROLOGY

## 2021-12-29 RX ORDER — BUPROPION HYDROCHLORIDE 150 MG/1
150 TABLET ORAL NIGHTLY
COMMUNITY
Start: 2021-08-17 | End: 2022-02-21

## 2022-01-04 LAB
COMPN STONE: NORMAL
SPECIMEN SOURCE: NORMAL
STONE ANALYSIS IR-IMP: NORMAL

## 2022-01-05 ENCOUNTER — PATIENT MESSAGE (OUTPATIENT)
Dept: UROLOGY | Facility: CLINIC | Age: 32
End: 2022-01-05
Payer: COMMERCIAL

## 2022-01-05 ENCOUNTER — TELEPHONE (OUTPATIENT)
Dept: UROLOGY | Facility: CLINIC | Age: 32
End: 2022-01-05
Payer: COMMERCIAL

## 2022-01-05 ENCOUNTER — PES CALL (OUTPATIENT)
Dept: ADMINISTRATIVE | Facility: CLINIC | Age: 32
End: 2022-01-05
Payer: COMMERCIAL

## 2022-01-05 NOTE — TELEPHONE ENCOUNTER
Sent now per below via Portal.    ----- Message from Sushant Charles MD sent at 1/5/2022  7:45 AM CST -----  Please send the patient the calcium-oxalate dietary sheet.

## 2022-02-21 ENCOUNTER — OFFICE VISIT (OUTPATIENT)
Dept: ORTHOPEDICS | Facility: CLINIC | Age: 32
End: 2022-02-21
Payer: COMMERCIAL

## 2022-02-21 ENCOUNTER — PATIENT MESSAGE (OUTPATIENT)
Dept: ORTHOPEDICS | Facility: CLINIC | Age: 32
End: 2022-02-21

## 2022-02-21 ENCOUNTER — HOSPITAL ENCOUNTER (OUTPATIENT)
Dept: RADIOLOGY | Facility: HOSPITAL | Age: 32
Discharge: HOME | End: 2022-02-21
Attending: NURSE PRACTITIONER
Payer: COMMERCIAL

## 2022-02-21 VITALS — HEIGHT: 67 IN | BODY MASS INDEX: 31.73 KG/M2 | WEIGHT: 202.19 LBS

## 2022-02-21 DIAGNOSIS — M79.672 LEFT FOOT PAIN: ICD-10-CM

## 2022-02-21 DIAGNOSIS — M10.9 ACUTE GOUT INVOLVING TOE OF LEFT FOOT, UNSPECIFIED CAUSE: Primary | ICD-10-CM

## 2022-02-21 DIAGNOSIS — M79.672 LEFT FOOT PAIN: Primary | ICD-10-CM

## 2022-02-21 DIAGNOSIS — M79.675 GREAT TOE PAIN, LEFT: Primary | ICD-10-CM

## 2022-02-21 PROCEDURE — 73630 XR FOOT COMPLETE 3 VIEW LEFT: ICD-10-PCS | Mod: 26,LT,, | Performed by: RADIOLOGY

## 2022-02-21 PROCEDURE — 99999 PR PBB SHADOW E&M-EST. PATIENT-LVL III: ICD-10-PCS | Mod: PBBFAC,,, | Performed by: NURSE PRACTITIONER

## 2022-02-21 PROCEDURE — 99204 OFFICE O/P NEW MOD 45 MIN: CPT | Mod: S$GLB,,, | Performed by: NURSE PRACTITIONER

## 2022-02-21 PROCEDURE — 99204 PR OFFICE/OUTPT VISIT, NEW, LEVL IV, 45-59 MIN: ICD-10-PCS | Mod: S$GLB,,, | Performed by: NURSE PRACTITIONER

## 2022-02-21 PROCEDURE — 73630 X-RAY EXAM OF FOOT: CPT | Mod: TC,LT

## 2022-02-21 PROCEDURE — 73630 X-RAY EXAM OF FOOT: CPT | Mod: 26,LT,, | Performed by: RADIOLOGY

## 2022-02-21 PROCEDURE — 99999 PR PBB SHADOW E&M-EST. PATIENT-LVL III: CPT | Mod: PBBFAC,,, | Performed by: NURSE PRACTITIONER

## 2022-02-21 RX ORDER — COLCHICINE 0.6 MG/1
0.6 TABLET ORAL DAILY
Qty: 3 TABLET | Refills: 0 | Status: SHIPPED | OUTPATIENT
Start: 2022-02-21 | End: 2022-02-24

## 2022-02-21 RX ORDER — PREDNISONE 20 MG/1
40 TABLET ORAL DAILY
Qty: 6 TABLET | Refills: 0 | Status: SHIPPED | OUTPATIENT
Start: 2022-02-21 | End: 2022-02-24

## 2022-02-21 NOTE — PROGRESS NOTES
SUBJECTIVE:     Chief Complaint & History of Present Illness:  Germain Parrish is a New 31 y.o. year old male patient here for constant left great toe pain with redness and swelling since this weekend.  He noticed after spending the day at a parade.  There is not a history of injury.  The pain is located in the left great toe joint.  The pain is described as achy, 9/10.  It is aggravated by weight bearing or bending.  There is not radiation, numbness or tingling into the toes.  Associated symptoms include redness, swelling, worsens with activity. Previous treatments include none which have provided minimal relief.  There is not a history of previous injury or surgery to the foot.   The patient does not use an assistive device.    Review of patient's allergies indicates:  No Known Allergies      Current Outpatient Medications   Medication Sig Dispense Refill    buPROPion (WELLBUTRIN XL) 150 MG TB24 tablet Take 150 mg by mouth nightly.      predniSONE (DELTASONE) 20 MG tablet One tab daily. 5 tablet 0    promethazine-codeine 6.25-10 mg/5 ml (PHENERGAN WITH CODEINE) 6.25-10 mg/5 mL syrup Take 5 mLs by mouth every 6 (six) hours as needed for Cough. 120 mL 0    sertraline (ZOLOFT) 100 MG tablet       ubrogepant (UBRELVY)tablet 100 mg Take 1 tablet (100 mg total) by mouth daily as needed for Migraine. 8 tablet 1    sumatriptan (IMITREX) 50 MG tablet Take 1 tablet (50 mg total) by mouth every 2 (two) hours as needed for Migraine (Do not exceed 2 doses in 24 hour period). (Patient not taking: Reported on 7/14/2021) 20 tablet 1     No current facility-administered medications for this visit.       Past Medical History:   Diagnosis Date    Ankle sprain     Anxiety     Depression     Migraine        Past Surgical History:   Procedure Laterality Date    CYST REMOVAL         Family History   Problem Relation Age of Onset    No Known Problems Mother         cyst    No Known Problems Father        Review of  "Systems:  ROS:  Constitutional: no fever or chills  Eyes: no visual changes  ENT: no nasal congestion or sore throat  Respiratory: no cough or shortness of breath  Cardiovascular: no chest pain or palpitations  Gastrointestinal: no nausea or vomiting, tolerating diet  Genitourinary: no hematuria or dysuria  Integument/Breast: no rash or pruritis  Hematologic/Lymphatic: no easy bruising or lymphadenopathy  Musculoskeletal: left great toe pain  Neurological: no seizures or tremors  Behavioral/Psych: no auditory or visual hallucinations  Endocrine: no heat or cold intolerance      OBJECTIVE:     PHYSICAL EXAM:  Vital Signs (Most Recent)  There were no vitals filed for this visit.  Height: 5' 7" (170.2 cm) Weight: 91.7 kg (202 lb 2.6 oz),   Estimated body mass index is 31.66 kg/m² as calculated from the following:    Height as of this encounter: 5' 7" (1.702 m).    Weight as of this encounter: 91.7 kg (202 lb 2.6 oz).   General Appearance: Well nourished, well developed, in no acute distress.  HENT: Normal cephalic, oropharynx pink and moist  Eyes: PERRLA bilaterally and EOM x 4  Respiratory: Even and unlabored  Skin: Warm and Dry. Red at the DIP joint  Psychiatric: AAO x 4, Mood & affect are normal.    left  Foot/Ankle    General appearance: no acute distress, alert/oriented x3, appropriate mood and affect, looks stated age and well nourished  The examination was performed out of splint/cast  Skin: erythema to DIP joint  Swelling: minimal and mild  Warmth: no warmth  Tenderness: mild  ROM: 20 degrees dorsiflexion, 20 degrees plantarflexion, 20 degrees inversion and 20 degrees eversion  Strength: normal  Gait: antalgic  Stability: stable to testing and Cotton test: negative  Crepitus: no  Neurological Exam: normal  Vascular Exam: normal and pulse present    TTP at the DIP joint of left great toe      RADIOGRAPHS:  X-ray of left foot obtained, findings are normal.  No fractures seen.  All radiographs were personally " reviewed by me.    ASSESSMENT/PLAN:       ICD-10-CM ICD-9-CM   1. Great toe pain, left  M79.675 729.5       Plan:  -Germain KELLI Durandsavage presents to clinic today with c/c left great toe pain with associated redness and swelling for the past 2 days.  -X-ray as above.  -Recommend RICE therapy.  -I suspect gout.  Will check uric acid, if elevated will treat with Prednisone and Colchicine and recommend to follow up with PCP/Rheumatology.

## 2022-02-21 NOTE — TELEPHONE ENCOUNTER
Uric acid is 9.6, patient notified that his symptoms are likely related to gout.  Will treat with prednisone 40 mg PO daily x 3 and colchicine 0.6 mg daily x 3.  Recommend to avoid foods high in purines.  Recommend to follow up with PCP and/or Rheumatology for ongoing management.  Verb understanding.

## 2022-03-17 RX ORDER — SERTRALINE HYDROCHLORIDE 100 MG/1
100 TABLET, FILM COATED ORAL DAILY
Qty: 90 TABLET | Refills: 3 | Status: SHIPPED | OUTPATIENT
Start: 2022-03-17 | End: 2022-12-07 | Stop reason: SDUPTHER

## 2022-03-17 NOTE — TELEPHONE ENCOUNTER
----- Message from Claribel Jeronimo sent at 3/17/2022  7:35 AM CDT -----  Type:  Needs Medical Advice    Who Called: self  Reason:refill on sertraline (ZOLOFT) 100 MG tablet  Would the patient rather a call back or a response via [facility]? call  Best Call Back Number: [phone]  Additional Information: [facility] #[id number] - [place], [place] - [address]   Phone:  [phone]  Fax:  [phone]

## 2022-03-17 NOTE — TELEPHONE ENCOUNTER
No new care gaps identified.  Powered by [employer]. Reference number: [id number].   3/17/2022 9:28:54 AM CDT

## 2022-10-20 ENCOUNTER — HOSPITAL ENCOUNTER (INPATIENT)
Facility: HOSPITAL | Age: 32
LOS: 2 days | Discharge: HOME | DRG: 194 | End: 2022-10-22
Attending: STUDENT IN AN ORGANIZED HEALTH CARE EDUCATION/TRAINING PROGRAM | Admitting: HOSPITALIST
Payer: COMMERCIAL

## 2022-10-20 DIAGNOSIS — J18.9 PNEUMONIA: Primary | ICD-10-CM

## 2022-10-20 DIAGNOSIS — R06.02 SHORTNESS OF BREATH: ICD-10-CM

## 2022-10-20 DIAGNOSIS — R07.9 CHEST PAIN: ICD-10-CM

## 2022-10-20 PROBLEM — R19.7 DIARRHEA: Status: ACTIVE | Noted: 2022-10-20

## 2022-10-20 PROBLEM — E87.6 HYPOKALEMIA: Status: ACTIVE | Noted: 2022-10-20

## 2022-10-20 PROBLEM — E87.1 HYPONATREMIA: Status: ACTIVE | Noted: 2022-10-20

## 2022-10-20 LAB
ALBUMIN SERPL BCP-MCNC: 4.2 G/DL (ref 3.5–5.2)
ALP SERPL-CCNC: 71 U/L (ref 55–135)
ALT SERPL W/O P-5'-P-CCNC: 39 U/L (ref 10–44)
ANION GAP SERPL CALC-SCNC: 12 MMOL/L (ref 8–16)
ANISOCYTOSIS BLD QL SMEAR: SLIGHT
AST SERPL-CCNC: 61 U/L (ref 10–40)
BASOPHILS # BLD AUTO: ABNORMAL K/UL (ref 0–0.2)
BASOPHILS NFR BLD: 0 % (ref 0–1.9)
BILIRUB SERPL-MCNC: 1.1 MG/DL (ref 0.1–1)
BUN SERPL-MCNC: 17 MG/DL (ref 6–20)
CALCIUM SERPL-MCNC: 10.1 MG/DL (ref 8.7–10.5)
CHLORIDE SERPL-SCNC: 94 MMOL/L (ref 95–110)
CO2 SERPL-SCNC: 26 MMOL/L (ref 23–29)
CREAT SERPL-MCNC: 1.5 MG/DL (ref 0.5–1.4)
DIFFERENTIAL METHOD: ABNORMAL
EOSINOPHIL # BLD AUTO: ABNORMAL K/UL (ref 0–0.5)
EOSINOPHIL NFR BLD: 0 % (ref 0–8)
ERYTHROCYTE [DISTWIDTH] IN BLOOD BY AUTOMATED COUNT: 12.6 % (ref 11.5–14.5)
EST. GFR  (NO RACE VARIABLE): >60 ML/MIN/1.73 M^2
GLUCOSE SERPL-MCNC: 104 MG/DL (ref 70–110)
HCT VFR BLD AUTO: 39.5 % (ref 40–54)
HCV AB SERPL QL IA: NORMAL
HGB BLD-MCNC: 13.8 G/DL (ref 14–18)
HIV 1+2 AB+HIV1 P24 AG SERPL QL IA: NORMAL
HYPOCHROMIA BLD QL SMEAR: ABNORMAL
IMM GRANULOCYTES # BLD AUTO: ABNORMAL K/UL (ref 0–0.04)
IMM GRANULOCYTES NFR BLD AUTO: ABNORMAL % (ref 0–0.5)
INFLUENZA A, MOLECULAR: NEGATIVE
INFLUENZA B, MOLECULAR: NEGATIVE
LACTATE SERPL-SCNC: 1.9 MMOL/L (ref 0.5–2.2)
LYMPHOCYTES # BLD AUTO: ABNORMAL K/UL (ref 1–4.8)
LYMPHOCYTES NFR BLD: 5 % (ref 18–48)
MCH RBC QN AUTO: 27.8 PG (ref 27–31)
MCHC RBC AUTO-ENTMCNC: 34.9 G/DL (ref 32–36)
MCV RBC AUTO: 80 FL (ref 82–98)
MONOCYTES # BLD AUTO: ABNORMAL K/UL (ref 0.3–1)
MONOCYTES NFR BLD: 2 % (ref 4–15)
NEUTROPHILS NFR BLD: 89 % (ref 38–73)
NEUTS BAND NFR BLD MANUAL: 4 %
NRBC BLD-RTO: 0 /100 WBC
OVALOCYTES BLD QL SMEAR: ABNORMAL
PLATELET # BLD AUTO: 246 K/UL (ref 150–450)
PMV BLD AUTO: 10.2 FL (ref 9.2–12.9)
POIKILOCYTOSIS BLD QL SMEAR: SLIGHT
POLYCHROMASIA BLD QL SMEAR: ABNORMAL
POTASSIUM SERPL-SCNC: 2.9 MMOL/L (ref 3.5–5.1)
PROT SERPL-MCNC: 9.2 G/DL (ref 6–8.4)
RBC # BLD AUTO: 4.96 M/UL (ref 4.6–6.2)
SARS-COV-2 RDRP RESP QL NAA+PROBE: NEGATIVE
SODIUM SERPL-SCNC: 132 MMOL/L (ref 136–145)
SPECIMEN SOURCE: NORMAL
WBC # BLD AUTO: 8.84 K/UL (ref 3.9–12.7)

## 2022-10-20 PROCEDURE — 96365 THER/PROPH/DIAG IV INF INIT: CPT

## 2022-10-20 PROCEDURE — 25000003 PHARM REV CODE 250: Performed by: PHYSICIAN ASSISTANT

## 2022-10-20 PROCEDURE — 93010 ELECTROCARDIOGRAM REPORT: CPT | Mod: 77,,, | Performed by: INTERNAL MEDICINE

## 2022-10-20 PROCEDURE — 63600175 PHARM REV CODE 636 W HCPCS: Performed by: PHYSICIAN ASSISTANT

## 2022-10-20 PROCEDURE — 27000207 HC ISOLATION

## 2022-10-20 PROCEDURE — 83605 ASSAY OF LACTIC ACID: CPT | Performed by: PHYSICIAN ASSISTANT

## 2022-10-20 PROCEDURE — 99223 PR INITIAL HOSPITAL CARE,LEVL III: ICD-10-PCS | Mod: ,,,

## 2022-10-20 PROCEDURE — 63600175 PHARM REV CODE 636 W HCPCS

## 2022-10-20 PROCEDURE — 25000003 PHARM REV CODE 250: Performed by: HOSPITALIST

## 2022-10-20 PROCEDURE — 96366 THER/PROPH/DIAG IV INF ADDON: CPT

## 2022-10-20 PROCEDURE — 93005 ELECTROCARDIOGRAM TRACING: CPT

## 2022-10-20 PROCEDURE — 93010 EKG 12-LEAD: ICD-10-PCS | Mod: 77,,, | Performed by: INTERNAL MEDICINE

## 2022-10-20 PROCEDURE — 99223 1ST HOSP IP/OBS HIGH 75: CPT | Mod: ,,,

## 2022-10-20 PROCEDURE — 12000002 HC ACUTE/MED SURGE SEMI-PRIVATE ROOM

## 2022-10-20 PROCEDURE — 87389 HIV-1 AG W/HIV-1&-2 AB AG IA: CPT | Performed by: PHYSICIAN ASSISTANT

## 2022-10-20 PROCEDURE — 87040 BLOOD CULTURE FOR BACTERIA: CPT | Mod: 59 | Performed by: PHYSICIAN ASSISTANT

## 2022-10-20 PROCEDURE — 63600175 PHARM REV CODE 636 W HCPCS: Performed by: HOSPITALIST

## 2022-10-20 PROCEDURE — 87502 INFLUENZA DNA AMP PROBE: CPT

## 2022-10-20 PROCEDURE — 85007 BL SMEAR W/DIFF WBC COUNT: CPT | Performed by: PHYSICIAN ASSISTANT

## 2022-10-20 PROCEDURE — 80053 COMPREHEN METABOLIC PANEL: CPT | Performed by: PHYSICIAN ASSISTANT

## 2022-10-20 PROCEDURE — 99285 EMERGENCY DEPT VISIT HI MDM: CPT | Mod: 25

## 2022-10-20 PROCEDURE — 85027 COMPLETE CBC AUTOMATED: CPT | Performed by: PHYSICIAN ASSISTANT

## 2022-10-20 PROCEDURE — 99285 EMERGENCY DEPT VISIT HI MDM: CPT | Mod: CS,,, | Performed by: PHYSICIAN ASSISTANT

## 2022-10-20 PROCEDURE — 93010 ELECTROCARDIOGRAM REPORT: CPT | Mod: ,,, | Performed by: INTERNAL MEDICINE

## 2022-10-20 PROCEDURE — 93010 EKG 12-LEAD: ICD-10-PCS | Mod: ,,, | Performed by: INTERNAL MEDICINE

## 2022-10-20 PROCEDURE — U0002 COVID-19 LAB TEST NON-CDC: HCPCS | Performed by: PHYSICIAN ASSISTANT

## 2022-10-20 PROCEDURE — 87502 INFLUENZA DNA AMP PROBE: CPT | Performed by: PHYSICIAN ASSISTANT

## 2022-10-20 PROCEDURE — 86803 HEPATITIS C AB TEST: CPT | Performed by: PHYSICIAN ASSISTANT

## 2022-10-20 PROCEDURE — 99285 PR EMERGENCY DEPT VISIT,LEVEL V: ICD-10-PCS | Mod: CS,,, | Performed by: PHYSICIAN ASSISTANT

## 2022-10-20 PROCEDURE — 87081 CULTURE SCREEN ONLY: CPT | Performed by: PHYSICIAN ASSISTANT

## 2022-10-20 PROCEDURE — 96361 HYDRATE IV INFUSION ADD-ON: CPT

## 2022-10-20 RX ORDER — LEVOFLOXACIN 5 MG/ML
750 INJECTION, SOLUTION INTRAVENOUS
Status: DISCONTINUED | OUTPATIENT
Start: 2022-10-21 | End: 2022-10-22

## 2022-10-20 RX ORDER — ACETAMINOPHEN 325 MG/1
650 TABLET ORAL EVERY 4 HOURS PRN
Status: DISCONTINUED | OUTPATIENT
Start: 2022-10-20 | End: 2022-10-21

## 2022-10-20 RX ORDER — IBUPROFEN 200 MG
16 TABLET ORAL
Status: DISCONTINUED | OUTPATIENT
Start: 2022-10-20 | End: 2022-10-22 | Stop reason: HOSPADM

## 2022-10-20 RX ORDER — GUAIFENESIN 600 MG/1
600 TABLET, EXTENDED RELEASE ORAL 2 TIMES DAILY
Status: DISCONTINUED | OUTPATIENT
Start: 2022-10-20 | End: 2022-10-21

## 2022-10-20 RX ORDER — POLYETHYLENE GLYCOL 3350 17 G/17G
17 POWDER, FOR SOLUTION ORAL DAILY PRN
Status: DISCONTINUED | OUTPATIENT
Start: 2022-10-20 | End: 2022-10-22 | Stop reason: HOSPADM

## 2022-10-20 RX ORDER — IPRATROPIUM BROMIDE AND ALBUTEROL SULFATE 2.5; .5 MG/3ML; MG/3ML
3 SOLUTION RESPIRATORY (INHALATION) EVERY 4 HOURS PRN
Status: DISCONTINUED | OUTPATIENT
Start: 2022-10-20 | End: 2022-10-22 | Stop reason: HOSPADM

## 2022-10-20 RX ORDER — PROCHLORPERAZINE EDISYLATE 5 MG/ML
5 INJECTION INTRAMUSCULAR; INTRAVENOUS EVERY 6 HOURS PRN
Status: DISCONTINUED | OUTPATIENT
Start: 2022-10-20 | End: 2022-10-22 | Stop reason: HOSPADM

## 2022-10-20 RX ORDER — TALC
6 POWDER (GRAM) TOPICAL NIGHTLY PRN
Status: DISCONTINUED | OUTPATIENT
Start: 2022-10-20 | End: 2022-10-22 | Stop reason: HOSPADM

## 2022-10-20 RX ORDER — POTASSIUM CHLORIDE 20 MEQ/1
40 TABLET, EXTENDED RELEASE ORAL ONCE
Status: COMPLETED | OUTPATIENT
Start: 2022-10-20 | End: 2022-10-20

## 2022-10-20 RX ORDER — SERTRALINE HYDROCHLORIDE 25 MG/1
100 TABLET, FILM COATED ORAL DAILY
Status: DISCONTINUED | OUTPATIENT
Start: 2022-10-21 | End: 2022-10-22 | Stop reason: HOSPADM

## 2022-10-20 RX ORDER — BENZONATATE 100 MG/1
100 CAPSULE ORAL 3 TIMES DAILY PRN
Status: DISCONTINUED | OUTPATIENT
Start: 2022-10-20 | End: 2022-10-22 | Stop reason: HOSPADM

## 2022-10-20 RX ORDER — SODIUM CHLORIDE 0.9 % (FLUSH) 0.9 %
10 SYRINGE (ML) INJECTION EVERY 8 HOURS PRN
Status: DISCONTINUED | OUTPATIENT
Start: 2022-10-20 | End: 2022-10-22 | Stop reason: HOSPADM

## 2022-10-20 RX ORDER — GLUCAGON 1 MG
1 KIT INJECTION
Status: DISCONTINUED | OUTPATIENT
Start: 2022-10-20 | End: 2022-10-22 | Stop reason: HOSPADM

## 2022-10-20 RX ORDER — IBUPROFEN 200 MG
24 TABLET ORAL
Status: DISCONTINUED | OUTPATIENT
Start: 2022-10-20 | End: 2022-10-22 | Stop reason: HOSPADM

## 2022-10-20 RX ORDER — NALOXONE HCL 0.4 MG/ML
0.02 VIAL (ML) INJECTION
Status: DISCONTINUED | OUTPATIENT
Start: 2022-10-20 | End: 2022-10-22 | Stop reason: HOSPADM

## 2022-10-20 RX ORDER — BISACODYL 10 MG
10 SUPPOSITORY, RECTAL RECTAL DAILY PRN
Status: DISCONTINUED | OUTPATIENT
Start: 2022-10-20 | End: 2022-10-22 | Stop reason: HOSPADM

## 2022-10-20 RX ORDER — ENOXAPARIN SODIUM 100 MG/ML
40 INJECTION SUBCUTANEOUS EVERY 24 HOURS
Status: DISCONTINUED | OUTPATIENT
Start: 2022-10-20 | End: 2022-10-22 | Stop reason: HOSPADM

## 2022-10-20 RX ORDER — LEVOFLOXACIN 750 MG/1
750 TABLET ORAL DAILY
Status: DISCONTINUED | OUTPATIENT
Start: 2022-10-20 | End: 2022-10-20

## 2022-10-20 RX ORDER — ONDANSETRON 2 MG/ML
4 INJECTION INTRAMUSCULAR; INTRAVENOUS EVERY 8 HOURS PRN
Status: DISCONTINUED | OUTPATIENT
Start: 2022-10-20 | End: 2022-10-22 | Stop reason: HOSPADM

## 2022-10-20 RX ADMIN — SODIUM CHLORIDE, SODIUM LACTATE, POTASSIUM CHLORIDE, AND CALCIUM CHLORIDE 1000 ML: .6; .31; .03; .02 INJECTION, SOLUTION INTRAVENOUS at 12:10

## 2022-10-20 RX ADMIN — VANCOMYCIN HYDROCHLORIDE 2000 MG: 500 INJECTION, POWDER, LYOPHILIZED, FOR SOLUTION INTRAVENOUS at 04:10

## 2022-10-20 RX ADMIN — GUAIFENESIN 600 MG: 600 TABLET, EXTENDED RELEASE ORAL at 09:10

## 2022-10-20 RX ADMIN — ENOXAPARIN SODIUM 40 MG: 100 INJECTION SUBCUTANEOUS at 04:10

## 2022-10-20 RX ADMIN — ACETAMINOPHEN 650 MG: 325 TABLET ORAL at 04:10

## 2022-10-20 RX ADMIN — Medication 6 MG: at 09:10

## 2022-10-20 RX ADMIN — SODIUM CHLORIDE, SODIUM LACTATE, POTASSIUM CHLORIDE, AND CALCIUM CHLORIDE 750 ML: .6; .31; .03; .02 INJECTION, SOLUTION INTRAVENOUS at 10:10

## 2022-10-20 RX ADMIN — SODIUM CHLORIDE, SODIUM LACTATE, POTASSIUM CHLORIDE, AND CALCIUM CHLORIDE 1000 ML: .6; .31; .03; .02 INJECTION, SOLUTION INTRAVENOUS at 02:10

## 2022-10-20 RX ADMIN — LEVOFLOXACIN 750 MG: 750 TABLET, FILM COATED ORAL at 12:10

## 2022-10-20 RX ADMIN — ACETAMINOPHEN 650 MG: 325 TABLET ORAL at 09:10

## 2022-10-20 RX ADMIN — POTASSIUM CHLORIDE 40 MEQ: 1500 TABLET, EXTENDED RELEASE ORAL at 04:10

## 2022-10-20 RX ADMIN — POTASSIUM BICARBONATE 40 MEQ: 391 TABLET, EFFERVESCENT ORAL at 12:10

## 2022-10-20 NOTE — ASSESSMENT & PLAN NOTE
- reports cough, congestion, night sweats, nausea, diarrhea for several days  - Taken Azithromycin/Augmentin x 3 days with worsening of symptoms  - SIRS 2/4 on arrival: HR >90, RR >20  - WBC 8.84, Lactic 1.9  - Covid/flu negative   - viral respiratory panel pending  - higher suspicion for legionella with n/v/d, hyponatremia, and elevated AST  - legionella and respiratory culture pending  - blood culture pending  - CXR with abnormal chest radiograph demonstrating airspace consolidation in the mid and lower lung zones on the left, consistent with acute pneumonia  - Given Levo 750mg PO x1 and 1L IVF in the ED  - will give additional 1.5L IVF for sepsis protocol  - Started on Levo 750mg IV q24h and Vancomycin  - mucinex and benzonate for symptomatic tx

## 2022-10-20 NOTE — HPI
Germain Parrish is a 32 y.o. male with PMHx significant for depression and gout admitted to hospital medicine for L lobe PNA that has failed outpatient therapy. Patient reports productive cough, congestion and shortness of breath with associated intermittent nausea and diarrhea for the last week.  States that he was diagnosed with pneumonia at an  3 days ago, and was started on Azithromycin and Augmentin. Reports he has been compliant with both medications, however his symptoms have been worsening. Endorses night sweats the last 2 days, but denies fevers at home (T max 99). Reports 3-5 watery BMs per day. Wife states that their kids first had n/v/d about 10 days ago and their symptoms have resolved. Denies HA, blurred vision, CP, vomiting, dysuria, numbness/tingling, weakness.     In the ED, patient is tachycardic to max 134. SpO2 >94% on RA. WBC 8.84. Lactic 1.9. Na 132. K 2.9. Cr 1.5. Covid and flu negative. CXR with abnormal chest radiograph demonstrating airspace consolidation in the mid and lower lung zones on the left, consistent with acute pneumonia. Given Levo PO x1, Potassium bicarb 40mEq x1, and 1L IVF.

## 2022-10-20 NOTE — EKG INTERPRETATIONS - EMERGENCY DEPT.
EKG with sinus tachycardia, rate 124, ST depressions in the inferior lateral leads, no STEMI, no prior EKG for comparison.

## 2022-10-20 NOTE — PROGRESS NOTES
Pharmacokinetic Initial Assessment: IV Vancomycin    Assessment/Plan:    Initiate intravenous vancomycin with loading dose of 2000 mg once, done in ED.  Since SCr increased from baseline, check random vancomycin level with AM labs to determine if scheduling subsequent doses appropriate or if intermittent dosing indicated.   Desired empiric serum trough concentration is 15 to 20 mcg/mL.  Draw vancomycin random level with AM labs on 10/21/2022.  Pharmacy will continue to follow and monitor vancomycin.      Please contact pharmacy at extension [phone] with any questions regarding this assessment.     Thank you for the consult,   Daniel Bright       Patient brief summary:  Germain Parrish is a 32 y.o. male initiated on antimicrobial therapy with IV Vancomycin for treatment of suspected lower respiratory infection.    Drug Allergies:   Review of patient's allergies indicates:   Allergen Reactions    Lactose        Actual Body Weight:   91.6 kg    Renal Function:   Estimated Creatinine Clearance: 77 mL/min (A) (based on SCr of 1.5 mg/dL (H)).    CBC (last 72 hours):  Recent Labs   Lab Result Units 10/20/22  1202   WBC K/uL 8.84   Hemoglobin g/dL 13.8*   Hematocrit % 39.5*   Platelets K/uL 246   Gran % % 89.0*   Lymph % % 5.0*   Mono % % 2.0*   Eosinophil % % 0.0   Basophil % % 0.0   Differential Method  Manual       Metabolic Panel (last 72 hours):  Recent Labs   Lab Result Units 10/20/22  1202   Sodium mmol/L 132*   Potassium mmol/L 2.9*   Chloride mmol/L 94*   CO2 mmol/L 26   Glucose mg/dL 104   BUN mg/dL 17   Creatinine mg/dL 1.5*   Albumin g/dL 4.2   Total Bilirubin mg/dL 1.1*   Alkaline Phosphatase U/L 71   AST U/L 61*   ALT U/L 39       Drug levels (last 3 results):  No results for input(s): VANCOMYCINRA, VANCORANDOM, VANCOMYCINPE, VANCOPEAK, VANCOMYCINTR, VANCOTROUGH in the last 72 hours.    Microbiologic Results:  Microbiology Results (last 7 days)       Procedure Component Value Units Date/Time    Culture,  Respiratory with Gram Stain [[id number]]     Order Status: No result Specimen: Respiratory from Sputum     Respiratory Infection Panel (PCR), Nasopharyngeal [[id number]] Collected: 10/20/22 1346    Order Status: Completed Specimen: Nasopharyngeal Swab Updated: 10/20/22 1346     Respiratory Infection Panel Source NP Swab    Narrative:      For all other respiratory sources, order [id number] -  Respiratory Viral Panel by PCR    Influenza A & B by Molecular [[id number]] Collected: 10/20/22 1129    Order Status: Completed Specimen: Nasopharyngeal Swab Updated: 10/20/22 1312     Influenza A, Molecular Negative     Influenza B, Molecular Negative     Flu A & B Source Nasal swab    Blood culture #1 **CANNOT BE ORDERED STAT** [[id number]] Collected: 10/20/22 1202    Order Status: Sent Specimen: Blood from Peripheral, Hand, Left Updated: 10/20/22 1212    Blood culture #2 **CANNOT BE ORDERED STAT** [[id number]] Collected: 10/20/22 1202    Order Status: Sent Specimen: Blood from Peripheral, Hand, Left Updated: 10/20/22 1212

## 2022-10-20 NOTE — ED TRIAGE NOTES
Patient presents to the ER via POV c/o pneumonia like symptoms. Patient states he has a history of pneumonia and it's all the same symptoms as last time.

## 2022-10-20 NOTE — SUBJECTIVE & OBJECTIVE
Past Medical History:   Diagnosis Date    Ankle sprain     Anxiety     Depression     Migraine        Past Surgical History:   Procedure Laterality Date    CYST REMOVAL         Review of patient's allergies indicates:   Allergen Reactions    Lactose        No current facility-administered medications on file prior to encounter.     Current Outpatient Medications on File Prior to Encounter   Medication Sig    sertraline (ZOLOFT) 100 MG tablet Take 1 tablet (100 mg total) by mouth once daily.    colchicine (COLCRYS) 0.6 mg tablet Take 1 tablet (0.6 mg total) by mouth once daily. for 3 days    sumatriptan (IMITREX) 50 MG tablet Take 1 tablet (50 mg total) by mouth every 2 (two) hours as needed for Migraine (Do not exceed 2 doses in 24 hour period). (Patient not taking: Reported on 7/14/2021)    ubrogepant (UBRELVY)tablet 100 mg Take 1 tablet (100 mg total) by mouth daily as needed for Migraine.     Family History       Problem Relation (Age of Onset)    No Known Problems Mother, Father          Tobacco Use    Smoking status: Never    Smokeless tobacco: Never   Substance and Sexual Activity    Alcohol use: Yes    Drug use: Not Currently    Sexual activity: Yes     Partners: Female     Review of Systems   Constitutional:  Positive for chills and fatigue. Negative for activity change and fever.   HENT:  Negative for trouble swallowing.    Eyes:  Negative for photophobia and visual disturbance.   Respiratory:  Positive for cough and shortness of breath. Negative for chest tightness and wheezing.    Cardiovascular:  Negative for chest pain, palpitations and leg swelling.   Gastrointestinal:  Positive for abdominal pain, diarrhea, nausea and vomiting. Negative for constipation.   Genitourinary:  Negative for dysuria, frequency, hematuria and urgency.   Musculoskeletal:  Negative for arthralgias, back pain and gait problem.   Skin:  Negative for color change and rash.   Neurological:  Negative for dizziness, syncope,  weakness, light-headedness, numbness and headaches.   Psychiatric/Behavioral:  Negative for agitation and confusion. The patient is not nervous/anxious.    Objective:     Vital Signs (Most Recent):  Temp: 98.5 °F (36.9 °C) (10/20/22 1031)  Pulse: 109 (10/20/22 1630)  Resp: 15 (10/20/22 1630)  BP: 111/67 (10/20/22 1630)  SpO2: 95 % (10/20/22 1630) Vital Signs (24h Range):  Temp:  [98.5 °F (36.9 °C)] 98.5 °F (36.9 °C)  Pulse:  [109-134] 109  Resp:  [14-26] 15  SpO2:  [94 %-97 %] 95 %  BP: (111-126)/(67-78) 111/67     Weight: 91.6 kg (202 lb)  Body mass index is 31.64 kg/m².    Physical Exam  Vitals and nursing note reviewed.   Constitutional:       General: He is not in acute distress.     Appearance: He is well-developed. He is ill-appearing. He is not diaphoretic.   HENT:      Head: Normocephalic and atraumatic.      Mouth/Throat:      Pharynx: No oropharyngeal exudate.   Eyes:      Extraocular Movements: Extraocular movements intact.      Conjunctiva/sclera: Conjunctivae normal.      Pupils: Pupils are equal, round, and reactive to light.   Cardiovascular:      Rate and Rhythm: Normal rate and regular rhythm.      Heart sounds: Normal heart sounds.   Pulmonary:      Effort: Pulmonary effort is normal. No respiratory distress.      Breath sounds: Wheezing (L side) and rhonchi (L side) present.   Abdominal:      General: Bowel sounds are normal. There is no distension.      Palpations: Abdomen is soft.      Tenderness: There is no abdominal tenderness.   Musculoskeletal:         General: No tenderness. Normal range of motion.      Cervical back: Normal range of motion and neck supple.   Lymphadenopathy:      Cervical: No cervical adenopathy.   Skin:     General: Skin is warm and dry.      Capillary Refill: Capillary refill takes less than 2 seconds.      Findings: No rash.   Neurological:      Mental Status: He is alert and oriented to person, place, and time.      Cranial Nerves: No cranial nerve deficit.       Sensory: No sensory deficit.      Coordination: Coordination normal.   Psychiatric:         Behavior: Behavior normal.         Thought Content: Thought content normal.         Judgment: Judgment normal.         CRANIAL NERVES     CN III, IV, VI   Pupils are equal, round, and reactive to light.     Significant Labs: All pertinent labs within the past 24 hours have been reviewed.  CBC:   Recent Labs   Lab 10/20/22  1202   WBC 8.84   HGB 13.8*   HCT 39.5*        CMP:   Recent Labs   Lab 10/20/22  1202   *   K 2.9*   CL 94*   CO2 26      BUN 17   CREATININE 1.5*   CALCIUM 10.1   PROT 9.2*   ALBUMIN 4.2   BILITOT 1.1*   ALKPHOS 71   AST 61*   ALT 39   ANIONGAP 12     Lactic Acid:   Recent Labs   Lab 10/20/22  1202   LACTATE 1.9       Significant Imaging: I have reviewed all pertinent imaging results/findings within the past 24 hours.

## 2022-10-20 NOTE — ED PROVIDER NOTES
Encounter Date: 10/20/2022       History   No chief complaint on file.    32-year-old male with history of gout, nephrolithiasis presents to the emergency department for shortness breath.  Patient states that he was [facility] ago was started on azithromycin Augmentin however states that his symptoms are worsening.  States that he has shoulder and had viral URI last week and he began having intermittent nausea and diarrhea few days later developed chest congestion, cough and shortness of breath which prompted him to report to the urgent care.  Denies any measured fever states T-max at home was 99.0 but reports subjective chills as well as night sweats over the past 2 days.  Patient works at home and denies any water exposure.    The history is provided by the patient.   Review of patient's allergies indicates:   Allergen Reactions    Lactose      Past Medical History:   Diagnosis Date    Ankle sprain     Anxiety     Depression     Migraine      Past Surgical History:   Procedure Laterality Date    CYST REMOVAL       Family History   Problem Relation Age of Onset    No Known Problems Mother         cyst    No Known Problems Father      Social History     Tobacco Use    Smoking status: Never    Smokeless tobacco: Never   Substance Use Topics    Alcohol use: Yes    Drug use: Not Currently     Review of Systems   Constitutional:  Positive for chills and fatigue. Negative for fever.   HENT:  Negative for sore throat.    Respiratory:  Positive for cough and shortness of breath.    Cardiovascular:  Negative for chest pain.   Gastrointestinal:  Positive for abdominal pain, diarrhea and nausea.   Genitourinary:  Negative for difficulty urinating and dysuria.   Musculoskeletal: Negative.    Skin: Negative.    Neurological:  Negative for weakness.   Psychiatric/Behavioral:  Negative for confusion.      Physical Exam     Initial Vitals [10/20/22 1031]   BP Pulse Resp  Temp SpO2   126/72 (!) 134 (!) 24 98.5 °F (36.9 °C) (!) 94 %      MAP       --         Physical Exam    Nursing note and vitals reviewed.  Constitutional: He appears well-developed and well-nourished. He is not diaphoretic. No distress.   Appears pale   HENT:   Head: Normocephalic and atraumatic.   Eyes: Conjunctivae are normal. Pupils are equal, round, and reactive to light.   Neck: Neck supple.   Normal range of motion.  Cardiovascular:  Regular rhythm, normal heart sounds and intact distal pulses.     Exam reveals no gallop and no friction rub.       No murmur heard.  Pulmonary/Chest: He has rhonchi (LLL).   Abdominal: Abdomen is soft. There is no abdominal tenderness.   Musculoskeletal:         General: Normal range of motion.      Cervical back: Normal range of motion and neck supple.     Neurological: He is alert and oriented to person, place, and time. GCS score is 15. GCS eye subscore is 4. GCS verbal subscore is 5. GCS motor subscore is 6.   Skin: Skin is warm and dry. Capillary refill takes less than 2 seconds.   Psychiatric: He has a normal mood and affect.       ED Course   Procedures  Labs Reviewed   CBC W/ AUTO DIFFERENTIAL - Abnormal; Notable for the following components:       Result Value    Hemoglobin 13.8 (*)     Hematocrit 39.5 (*)     MCV 80 (*)     Gran % 89.0 (*)     Lymph % 5.0 (*)     Mono % 2.0 (*)     All other components within normal limits   COMPREHENSIVE METABOLIC PANEL - Abnormal; Notable for the following components:    Sodium 132 (*)     Potassium 2.9 (*)     Chloride 94 (*)     Creatinine 1.5 (*)     Total Protein 9.2 (*)     Total Bilirubin 1.1 (*)     AST 61 (*)     All other components within normal limits   INFLUENZA A & B BY MOLECULAR   RESPIRATORY INFECTION PANEL (PCR), NASOPHARYNGEAL    Narrative:     For all other respiratory sources, order [id number] -  Respiratory Viral Panel by PCR   CULTURE, BLOOD   CULTURE, BLOOD   HIV 1 / 2 ANTIBODY    Narrative:     Release to  patient->Immediate   HEPATITIS C ANTIBODY    Narrative:     Release to patient->Immediate   SARS-COV-2 RNA AMPLIFICATION, QUAL   CULTURE, LEGIONELLA   LACTIC ACID, PLASMA   LEGIONELLA ANTIGEN, URINE RANDOM   TROPONIN I   B-TYPE NATRIURETIC PEPTIDE        ECG Results              EKG 12-lead (Final result)  Result time 10/20/22 12:20:52      Final result by Interface, Lab In [facility] (10/20/22 12:20:52)                   Narrative:    Test Reason : R06.02,    Vent. Rate : 124 BPM     Atrial Rate : 124 BPM     P-R Int : 112 ms          QRS Dur : 082 ms      QT Int : 286 ms       P-R-T Axes : 036 050 005 degrees     QTc Int : 410 ms    Sinus tachycardia  ST and T wave abnormality, consider inferior ischemia  Abnormal ECG  When compared with ECG of 20-OCT-2022 10:32,  No significant change was found  Confirmed by AKILA SIMENTAL MD ([id number]) on 10/20/2022 12:20:45 PM    Referred By: System System           Confirmed By:AKILA SIMENTAL MD                                     EKG 12-lead (Final result)  Result time 10/20/22 11:25:18      Final result by Interface, Lab In [facility] (10/20/22 11:25:18)                   Narrative:    Test Reason : J18.9,    Vent. Rate : 128 BPM     Atrial Rate : 128 BPM     P-R Int : 116 ms          QRS Dur : 082 ms      QT Int : 390 ms       P-R-T Axes : 028 018 049 degrees     QTc Int : 569 ms    Sinus tachycardia  Possible Left atrial enlargement  ST and T wave abnormality, consider inferior ischemia  ST and T wave abnormality, consider anterolateral ischemia  Abnormal ECG  No previous ECGs available  Confirmed by Ray MCKEON MD ([id number]) on 10/20/2022 11:25:13 AM    Referred By:             Confirmed By:Ray MCKEON MD                                  Imaging Results              X-Ray Chest 1 View (Final result)  Result time 10/20/22 11:57:12      Final result by Adolfo Bo MD (10/20/22 11:57:12)                   Impression:      Abnormal chest radiograph demonstrating airspace consolidation in  the mid and lower lung zones on the left, consistent with acute pneumonia.  Examination is otherwise unremarkable, allowing for a poor inspiratory depth level.      Electronically signed by: Adolfo Bo MD  Date:    10/20/2022  Time:    11:57               Narrative:    EXAMINATION:  XR CHEST 1 VIEW    CLINICAL HISTORY:  shortness of breath;    TECHNIQUE:  One view.    COMPARISON:  No prior chest radiographs are currently available for comparison purposes.    FINDINGS:  Heart size is within normal limits.  Abnormal pulmonary parenchymal opacity representing airspace consolidation in the left mid and lower lung zones is appreciated.  Clinical correlation is essential, but this certainly would be consistent with acute pneumonia, which I suspect predominantly involves the lingular segment of the left upper lobe.  A very poor inspiratory depth level is present on this exam, but the lung zones otherwise appear clear.  No pleural fluid of any substantial volume is seen on either side.  No pneumothorax.                                       Medications   levoFLOXacin tablet 750 mg (750 mg Oral Given 10/20/22 1258)   lactated ringers bolus 1,000 mL (has no administration in time range)   lactated ringers bolus 1,000 mL (0 mLs Intravenous Stopped 10/20/22 1219)   potassium bicarbonate disintegrating tablet 40 mEq (40 mEq Oral Given 10/20/22 1258)     Medical Decision Making:   History:   Old Medical Records: I decided to obtain old medical records.  Independently Interpreted Test(s):   I have ordered and independently interpreted EKG Reading(s) - see summary below  Clinical Tests:   Lab Tests: Ordered and Reviewed  Radiological Study: Ordered and Reviewed  Medical Tests: Ordered and Reviewed     APC / Resident Notes:   32 y.o. year old male presenting with cough, shortness of breath.  On exam patient is afebrile and nontoxic.  Heart rate and rhythm are regular. Lungs with rhonchi in the LLL Abdomen is soft, nontender. No  edema.  Pale-appearing    DDx includes but is not limited to pneumonia, viral syndrome, COVID-19, influenza    ED workup reveals recently diagnosed with pneumonia with no improvement after azithromycin and Augmentin outpatient.  Borderline hypoxic at 94 on arrival and was tachycardic.  Afebrile but reports 2 days of night sweats.  On exam rhonchi to the left lower lobe consistent with pneumonia which was confirmed by chest x-ray.  Mildly hyponatremic and does report GI symptoms but denies any water exposure.  Did consider Legionella will screen.  Denies any hypercoagulability and is a nonsmoker or and without risk factors I feel PE is less likely given chest x-ray findings.  COVID-19 and influenza negative.  Started on IV fluids and Levaquin in the ED and will admit to hospital medicine for further antibiotics and management of pneumonia.     I discussed the care of this patient with my supervising physician.          ED Course as of 10/20/22 1545   Thu Oct 20, 2022   1246 Creatinine(!): 1.5 [HJ]   1246 Sodium(!): 132 [HJ]   1301 Lactate, Glenn: 1.9 [HJ]   1301 WBC: 8.84 [HJ]   1301 Creatinine(!): 1.5 [HJ]   1301 Potassium(!): 2.9 [HJ]      ED Course User Index  [HJ] Narinder Buck PA-C                 Clinical Impression:   Final diagnoses:  [J18.9] Pneumonia (Primary)  [R06.02] Shortness of breath        ED Disposition Condition    Observation Stable                Narinder Buck PA-C  10/20/22 1350       Narinder Buck PA-C  10/20/22 1545       Narinder Buck PA-C  10/20/22 1547

## 2022-10-20 NOTE — ED NOTES
Hourly rounding complete. Patient resting in stretcher and is in NAD at this time. Pt is awake alert and oriented x4, VSS. Pt denies pain at this time. Pt updated on POC. Bed low and locked, SR up x2, call bell in patient reach. Pt remains on continuous cardiac monitor, continuous pulse ox, and auto BP cuff. Pt voices no needs at this time.

## 2022-10-20 NOTE — ED NOTES
Patient identifiers verified and correct for Germain Parrish.  LOC: The patient is awake, alert and aware of environment with an appropriate affect, the patient is oriented x 3 and speaking appropriately.   APPEARANCE: Patient appears comfortable and in no acute distress, patient is clean and well groomed.  SKIN: The skin is warm and dry, color consistent with ethnicity, patient has normal skin turgor and moist mucus membranes, skin intact, no breakdown or bruising noted.   MUSCULOSKELETAL: Patient moving all extremities spontaneously, no swelling noted. Patient reports generalized weakness.  RESPIRATORY: Airway is open and patent, respirations are spontaneous, patient has a normal effort and rate, no accessory muscle use noted, pt placed on continuous pulse ox with O2 sats noted at 97% on room air. Patient c/o a productive cough with yellow/green sputum.   CARDIAC: Pt placed on cardiac monitor. Patient has a normal rate and regular rhythm, no edema noted, capillary refill < 3 seconds.   GASTRO: Soft and non tender to palpation, no distention noted, normoactive bowel sounds present in all four quadrants. Pt states bowel movements have been loose and watery. Patient also endorses nausea and vomiting.   : Pt denies any pain or frequency with urination.  NEURO: Pt opens eyes spontaneously, behavior appropriate to situation, follows commands, facial expression symmetrical, bilateral hand grasp equal and even, purposeful motor response noted, normal sensation in all extremities when touched with a finger.

## 2022-10-20 NOTE — ED NOTES
Hourly rounding complete. Patient resting in stretcher and is in NAD at this time. Pt is awake alert and oriented x4, VSS. Pt denies pain at this time. Pt updated on POC. Bed low and locked, SR up x2, call bell in patient reach. Pt remains on continuous cardiac monitor, continuous pulse ox, and auto BP cuff. Pt voices no needs at this time.     Patient informed of the room assignment. Patient currently laying on his side.

## 2022-10-20 NOTE — ASSESSMENT & PLAN NOTE
Patient has hypokalemia which is currently uncontrolled. Last electrolytes reviewed- Recent Labs   Lab 10/20/22  1202   K 2.9*   . Will replace potassium and monitor electrolytes closely. Continuous telemetry.

## 2022-10-20 NOTE — Clinical Note
Diagnosis: Pneumonia [332161]   Future Attending Provider: FARHAD MENARD [[id number]]   Admitting Provider:: FARHAD MENARD [[id number]]

## 2022-10-21 LAB
ALBUMIN SERPL BCP-MCNC: 3.1 G/DL (ref 3.5–5.2)
ALP SERPL-CCNC: 51 U/L (ref 55–135)
ALT SERPL W/O P-5'-P-CCNC: 34 U/L (ref 10–44)
ANION GAP SERPL CALC-SCNC: 11 MMOL/L (ref 8–16)
ANISOCYTOSIS BLD QL SMEAR: SLIGHT
AST SERPL-CCNC: 50 U/L (ref 10–40)
BASOPHILS # BLD AUTO: 0.03 K/UL (ref 0–0.2)
BASOPHILS NFR BLD: 0.5 % (ref 0–1.9)
BILIRUB SERPL-MCNC: 0.7 MG/DL (ref 0.1–1)
BUN SERPL-MCNC: 12 MG/DL (ref 6–20)
C DIFF GDH STL QL: NEGATIVE
C DIFF TOX A+B STL QL IA: NEGATIVE
CALCIUM SERPL-MCNC: 8.8 MG/DL (ref 8.7–10.5)
CHLORIDE SERPL-SCNC: 101 MMOL/L (ref 95–110)
CO2 SERPL-SCNC: 23 MMOL/L (ref 23–29)
CREAT SERPL-MCNC: 0.9 MG/DL (ref 0.5–1.4)
CRP SERPL-MCNC: 98.3 MG/L (ref 0–8.2)
DIFFERENTIAL METHOD: ABNORMAL
EOSINOPHIL # BLD AUTO: 0.2 K/UL (ref 0–0.5)
EOSINOPHIL NFR BLD: 2.8 % (ref 0–8)
ERYTHROCYTE [DISTWIDTH] IN BLOOD BY AUTOMATED COUNT: 12.8 % (ref 11.5–14.5)
EST. GFR  (NO RACE VARIABLE): >60 ML/MIN/1.73 M^2
GLUCOSE SERPL-MCNC: 93 MG/DL (ref 70–110)
HCT VFR BLD AUTO: 33.5 % (ref 40–54)
HGB BLD-MCNC: 11.7 G/DL (ref 14–18)
IMM GRANULOCYTES # BLD AUTO: 0.02 K/UL (ref 0–0.04)
IMM GRANULOCYTES NFR BLD AUTO: 0.3 % (ref 0–0.5)
LYMPHOCYTES # BLD AUTO: 2.1 K/UL (ref 1–4.8)
LYMPHOCYTES NFR BLD: 32.7 % (ref 18–48)
MAGNESIUM SERPL-MCNC: 1.9 MG/DL (ref 1.6–2.6)
MCH RBC QN AUTO: 27.9 PG (ref 27–31)
MCHC RBC AUTO-ENTMCNC: 34.9 G/DL (ref 32–36)
MCV RBC AUTO: 80 FL (ref 82–98)
MONOCYTES # BLD AUTO: 0.8 K/UL (ref 0.3–1)
MONOCYTES NFR BLD: 12.4 % (ref 4–15)
NEUTROPHILS # BLD AUTO: 3.3 K/UL (ref 1.8–7.7)
NEUTROPHILS NFR BLD: 51.3 % (ref 38–73)
NRBC BLD-RTO: 0 /100 WBC
OVALOCYTES BLD QL SMEAR: ABNORMAL
PLATELET # BLD AUTO: 156 K/UL (ref 150–450)
PLATELET BLD QL SMEAR: ABNORMAL
PMV BLD AUTO: 10.1 FL (ref 9.2–12.9)
POIKILOCYTOSIS BLD QL SMEAR: SLIGHT
POTASSIUM SERPL-SCNC: 3.1 MMOL/L (ref 3.5–5.1)
PROT SERPL-MCNC: 6.9 G/DL (ref 6–8.4)
RBC # BLD AUTO: 4.2 M/UL (ref 4.6–6.2)
SODIUM SERPL-SCNC: 135 MMOL/L (ref 136–145)
VANCOMYCIN SERPL-MCNC: 12.1 UG/ML
WBC # BLD AUTO: 6.45 K/UL (ref 3.9–12.7)

## 2022-10-21 PROCEDURE — 12000002 HC ACUTE/MED SURGE SEMI-PRIVATE ROOM

## 2022-10-21 PROCEDURE — 94761 N-INVAS EAR/PLS OXIMETRY MLT: CPT

## 2022-10-21 PROCEDURE — 87798 DETECT AGENT NOS DNA AMP: CPT

## 2022-10-21 PROCEDURE — 87449 NOS EACH ORGANISM AG IA: CPT

## 2022-10-21 PROCEDURE — 63600175 PHARM REV CODE 636 W HCPCS: Performed by: PHYSICIAN ASSISTANT

## 2022-10-21 PROCEDURE — 86140 C-REACTIVE PROTEIN: CPT

## 2022-10-21 PROCEDURE — 99233 PR SUBSEQUENT HOSPITAL CARE,LEVL III: ICD-10-PCS | Mod: ,,,

## 2022-10-21 PROCEDURE — 25000003 PHARM REV CODE 250: Performed by: PHYSICIAN ASSISTANT

## 2022-10-21 PROCEDURE — 80202 ASSAY OF VANCOMYCIN: CPT | Performed by: HOSPITALIST

## 2022-10-21 PROCEDURE — 83735 ASSAY OF MAGNESIUM: CPT | Performed by: PHYSICIAN ASSISTANT

## 2022-10-21 PROCEDURE — 85025 COMPLETE CBC W/AUTO DIFF WBC: CPT | Performed by: PHYSICIAN ASSISTANT

## 2022-10-21 PROCEDURE — 36415 COLL VENOUS BLD VENIPUNCTURE: CPT | Performed by: HOSPITALIST

## 2022-10-21 PROCEDURE — 99233 SBSQ HOSP IP/OBS HIGH 50: CPT | Mod: ,,,

## 2022-10-21 PROCEDURE — 25000003 PHARM REV CODE 250

## 2022-10-21 PROCEDURE — 63600175 PHARM REV CODE 636 W HCPCS: Performed by: HOSPITALIST

## 2022-10-21 PROCEDURE — 25000003 PHARM REV CODE 250: Performed by: HOSPITALIST

## 2022-10-21 PROCEDURE — 80053 COMPREHEN METABOLIC PANEL: CPT | Performed by: PHYSICIAN ASSISTANT

## 2022-10-21 RX ORDER — POTASSIUM CHLORIDE 20 MEQ/1
40 TABLET, EXTENDED RELEASE ORAL ONCE
Status: DISCONTINUED | OUTPATIENT
Start: 2022-10-21 | End: 2022-10-21

## 2022-10-21 RX ORDER — SODIUM CHLORIDE AND POTASSIUM CHLORIDE 150; 900 MG/100ML; MG/100ML
INJECTION, SOLUTION INTRAVENOUS CONTINUOUS
Status: DISPENSED | OUTPATIENT
Start: 2022-10-21 | End: 2022-10-22

## 2022-10-21 RX ORDER — PROMETHAZINE HYDROCHLORIDE AND CODEINE PHOSPHATE 6.25; 1 MG/5ML; MG/5ML
5 SOLUTION ORAL EVERY 4 HOURS PRN
Status: DISCONTINUED | OUTPATIENT
Start: 2022-10-21 | End: 2022-10-22 | Stop reason: HOSPADM

## 2022-10-21 RX ORDER — ACETAMINOPHEN 500 MG
1000 TABLET ORAL 3 TIMES DAILY
Status: DISCONTINUED | OUTPATIENT
Start: 2022-10-21 | End: 2022-10-22 | Stop reason: HOSPADM

## 2022-10-21 RX ORDER — SUMATRIPTAN 50 MG/1
50 TABLET, FILM COATED ORAL
Status: DISCONTINUED | OUTPATIENT
Start: 2022-10-21 | End: 2022-10-22 | Stop reason: HOSPADM

## 2022-10-21 RX ORDER — POTASSIUM CHLORIDE 20 MEQ/1
40 TABLET, EXTENDED RELEASE ORAL ONCE
Status: COMPLETED | OUTPATIENT
Start: 2022-10-21 | End: 2022-10-21

## 2022-10-21 RX ORDER — GUAIFENESIN 600 MG/1
600 TABLET, EXTENDED RELEASE ORAL 2 TIMES DAILY
Status: DISCONTINUED | OUTPATIENT
Start: 2022-10-21 | End: 2022-10-22 | Stop reason: HOSPADM

## 2022-10-21 RX ADMIN — SODIUM CHLORIDE AND POTASSIUM CHLORIDE: .9; .15 SOLUTION INTRAVENOUS at 05:10

## 2022-10-21 RX ADMIN — SUMATRIPTAN SUCCINATE 50 MG: 50 TABLET ORAL at 01:10

## 2022-10-21 RX ADMIN — PROMETHAZINE HYDROCHLORIDE AND CODEINE PHOSPHATE 5 ML: 10; 6.25 SOLUTION ORAL at 09:10

## 2022-10-21 RX ADMIN — ACETAMINOPHEN 1000 MG: 500 TABLET ORAL at 09:10

## 2022-10-21 RX ADMIN — ENOXAPARIN SODIUM 40 MG: 100 INJECTION SUBCUTANEOUS at 05:10

## 2022-10-21 RX ADMIN — PROMETHAZINE HYDROCHLORIDE AND CODEINE PHOSPHATE 5 ML: 10; 6.25 SOLUTION ORAL at 01:10

## 2022-10-21 RX ADMIN — VANCOMYCIN HYDROCHLORIDE 1500 MG: 1.5 INJECTION, POWDER, LYOPHILIZED, FOR SOLUTION INTRAVENOUS at 09:10

## 2022-10-21 RX ADMIN — SERTRALINE HYDROCHLORIDE 100 MG: 25 TABLET ORAL at 09:10

## 2022-10-21 RX ADMIN — BENZONATATE 100 MG: 100 CAPSULE ORAL at 03:10

## 2022-10-21 RX ADMIN — POTASSIUM CHLORIDE 40 MEQ: 1500 TABLET, EXTENDED RELEASE ORAL at 03:10

## 2022-10-21 RX ADMIN — ACETAMINOPHEN 1000 MG: 500 TABLET ORAL at 03:10

## 2022-10-21 RX ADMIN — PROMETHAZINE HYDROCHLORIDE AND CODEINE PHOSPHATE 5 ML: 10; 6.25 SOLUTION ORAL at 08:10

## 2022-10-21 RX ADMIN — ONDANSETRON 4 MG: 2 INJECTION INTRAMUSCULAR; INTRAVENOUS at 09:10

## 2022-10-21 RX ADMIN — LEVOFLOXACIN 750 MG: 750 INJECTION, SOLUTION INTRAVENOUS at 12:10

## 2022-10-21 NOTE — ASSESSMENT & PLAN NOTE
Patient has hypokalemia which is currently uncontrolled. Last electrolytes reviewed-   Recent Labs   Lab 10/20/22  1202 10/21/22  0248   K 2.9* 3.1*   Will replace potassium and monitor electrolytes closely. Continuous telemetry.

## 2022-10-21 NOTE — ASSESSMENT & PLAN NOTE
- Na 132 on admit  - likely 2/2 volume depletion  - IVF  - Repeat metabolic panel to monitor for response

## 2022-10-21 NOTE — PROGRESS NOTES
Pharmacokinetic Assessment Follow Up: IV Vancomycin    Vancomycin serum concentration assessment(s):    Scr trending down from 1.5 to 0.9 mg/dL   The random level was drawn correctly and can be used to guide therapy at this time. The measurement is below the desired definitive target range of 15 to 20 mcg/mL.    Vancomycin Regimen Plan:    Change regimen to Vancomycin 1500 mg IV every 12 hours with next serum trough concentration measured at 2030 prior to the fourth dose on 8/22    Drug levels (last 3 results):  Recent Labs   Lab Result Units 10/21/22  0248   Vancomycin, Random ug/mL 12.1       Pharmacy will continue to follow and monitor vancomycin.    Please contact pharmacy at extension [phone] for questions regarding this assessment.    Thank you for the consult,   Manfred Rodrigez       Patient brief summary:  Germain Parrish is a 32 y.o. male initiated on antimicrobial therapy with IV Vancomycin for treatment of lower respiratory infection      Drug Allergies:   Review of patient's allergies indicates:   Allergen Reactions    Lactose        Actual Body Weight:   87.9 kg    Renal Function:   Estimated Creatinine Clearance: 125.8 mL/min (based on SCr of 0.9 mg/dL).,     Dialysis Method (if applicable):  N/A    CBC (last 72 hours):  Recent Labs   Lab Result Units 10/20/22  1202 10/21/22  0248   WBC K/uL 8.84 6.45   Hemoglobin g/dL 13.8* 11.7*   Hematocrit % 39.5* 33.5*   Platelets K/uL 246 156   Gran % % 89.0* 51.3   Lymph % % 5.0* 32.7   Mono % % 2.0* 12.4   Eosinophil % % 0.0 2.8   Basophil % % 0.0 0.5   Differential Method  Manual Automated       Metabolic Panel (last 72 hours):  Recent Labs   Lab Result Units 10/20/22  1202 10/21/22  0248   Sodium mmol/L 132* 135*   Potassium mmol/L 2.9* 3.1*   Chloride mmol/L 94* 101   CO2 mmol/L 26 23   Glucose mg/dL 104 93   BUN mg/dL 17 12   Creatinine mg/dL 1.5* 0.9   Albumin g/dL 4.2 3.1*   Total Bilirubin mg/dL 1.1* 0.7   Alkaline Phosphatase U/L 71 51*   AST U/L 61* 50*    ALT U/L 39 34   Magnesium mg/dL  --  1.9       Vancomycin Administrations:  vancomycin given in the last 96 hours                     vancomycin 2 g in dextrose 5 % 500 mL IVPB (mg) 2,000 mg New Bag 10/20/22 1648                    Microbiologic Results:  Microbiology Results (last 7 days)       Procedure Component Value Units Date/Time    Clostridium difficile EIA [[id number]] Collected: 10/21/22 0423    Order Status: Sent Specimen: Stool Updated: 10/21/22 0609    Blood culture #2 **CANNOT BE ORDERED STAT** [[id number]] Collected: 10/20/22 1202    Order Status: Completed Specimen: Blood from Peripheral, Hand, Left Updated: 10/20/22 1915     Blood Culture, Routine No Growth to date    Blood culture #1 **CANNOT BE ORDERED STAT** [[id number]] Collected: 10/20/22 1202    Order Status: Completed Specimen: Blood from Peripheral, Hand, Left Updated: 10/20/22 1915     Blood Culture, Routine No Growth to date    Culture, Respiratory with Gram Stain [[id number]]     Order Status: No result Specimen: Respiratory from Sputum     Respiratory Infection Panel (PCR), Nasopharyngeal [[id number]] Collected: 10/20/22 1346    Order Status: Completed Specimen: Nasopharyngeal Swab Updated: 10/20/22 1346     Respiratory Infection Panel Source NP Swab    Narrative:      For all other respiratory sources, order [id number] -  Respiratory Viral Panel by PCR    Influenza A & B by Molecular [[id number]] Collected: 10/20/22 1129    Order Status: Completed Specimen: Nasopharyngeal Swab Updated: 10/20/22 1312     Influenza A, Molecular Negative     Influenza B, Molecular Negative     Flu A & B Source Nasal swab

## 2022-10-21 NOTE — ASSESSMENT & PLAN NOTE
Reporting 3-5 watery stools/day for the past 7-10 days  - possibly 2/2 viral gastroenteritis vs. legionnaires disease vs. c diff  - legionella culture pending  - will order c diff precautions and stool studies

## 2022-10-21 NOTE — PROGRESS NOTES
[facility] ([address] Medicine  Progress Note    Patient Name: Germain Parrish  MRN: [id number]  Patient Class: IP- Inpatient   Admission Date: 10/20/2022  Length of Stay: 1 days  Attending Physician: Lore Harmon MD  Primary Care Provider: Ramirez Edwards MD        Subjective:     Principal Problem:Pneumonia        HPI:  Germain Parrish is a 32 y.o. male with PMHx significant for depression and gout admitted to hospital medicine for L lobe PNA that has failed outpatient therapy. Patient reports productive cough, congestion and shortness of breath with associated intermittent nausea and diarrhea for the last week.  States that he was diagnosed with pneumonia at an  3 days ago, and was started on Azithromycin and Augmentin. Reports he has been compliant with both medications, however his symptoms have been worsening. Endorses night sweats the last 2 days, but denies fevers at home (T max 99). Reports 3-5 watery BMs per day. Wife states that their kids first had n/v/d about 10 days ago and their symptoms have resolved. Denies HA, blurred vision, CP, vomiting, dysuria, numbness/tingling, weakness.     In the ED, patient is tachycardic to max 134. SpO2 >94% on RA. WBC 8.84. Lactic 1.9. Na 132. K 2.9. Cr 1.5. Covid and flu negative. CXR with abnormal chest radiograph demonstrating airspace consolidation in the mid and lower lung zones on the left, consistent with acute pneumonia. Given Levo PO x1, Potassium bicarb 40mEq x1, and 1L IVF.       Overview/Hospital Course:  Germain Parrish is a 33yo M admitted to hospital medicine for further management of L lobe PNA. Started on IV levo 750 mg and vanc and given IVF. Blood cx pending. Respiratory viral panel pending. Resp culture pending. Reporting 3-5 watery BMs for the past ~10 days, C diff pending. Plan to transition to po abx when appropriate and discharge home with family.       Interval History: Tachycardic overnight, but otherwise  VSSAF. Reports 2 watery BMs overnight, stool studies collected. Reports difficulty sleeping 2/2 cough. Added on promethazine-codeine prn and scheduled tylenol. K 3.1; repleted.     Review of Systems   Constitutional:  Positive for chills and fatigue. Negative for activity change and fever.   HENT:  Negative for trouble swallowing.    Eyes:  Negative for photophobia and visual disturbance.   Respiratory:  Positive for cough and shortness of breath. Negative for chest tightness and wheezing.    Cardiovascular:  Negative for chest pain, palpitations and leg swelling.   Gastrointestinal:  Positive for abdominal pain, diarrhea, nausea and vomiting. Negative for constipation.   Genitourinary:  Negative for dysuria, frequency, hematuria and urgency.   Musculoskeletal:  Negative for arthralgias, back pain and gait problem.   Skin:  Negative for color change and rash.   Neurological:  Negative for dizziness, syncope, weakness, light-headedness, numbness and headaches.   Psychiatric/Behavioral:  Negative for agitation and confusion. The patient is not nervous/anxious.    Objective:     Vital Signs (Most Recent):  Temp: 97.9 °F (36.6 °C) (10/21/22 0413)  Pulse: 106 (10/21/22 0413)  Resp: 19 (10/21/22 0413)  BP: 124/69 (10/21/22 0413)  SpO2: 99 % (10/21/22 0413) Vital Signs (24h Range):  Temp:  [97.9 °F (36.6 °C)-98.6 °F (37 °C)] 97.9 °F (36.6 °C)  Pulse:  [] 106  Resp:  [13-26] 19  SpO2:  [94 %-99 %] 99 %  BP: (100-130)/(60-78) 124/69     Weight: 87.9 kg (193 lb 12.6 oz)  Body mass index is 30.35 kg/m².    Intake/Output Summary (Last 24 hours) at 10/21/2022 0750  Last data filed at 10/20/2022 1929  Gross per 24 hour   Intake 2250 ml   Output --   Net 2250 ml      Physical Exam  Vitals and nursing note reviewed.   Constitutional:       General: He is not in acute distress.     Appearance: He is well-developed. He is ill-appearing. He is not diaphoretic.   HENT:      Head: Normocephalic and atraumatic.      Mouth/Throat:       Pharynx: No oropharyngeal exudate.   Eyes:      Extraocular Movements: Extraocular movements intact.      Conjunctiva/sclera: Conjunctivae normal.      Pupils: Pupils are equal, round, and reactive to light.   Cardiovascular:      Rate and Rhythm: Normal rate and regular rhythm.      Heart sounds: Normal heart sounds.   Pulmonary:      Effort: Pulmonary effort is normal. No respiratory distress.      Breath sounds: Wheezing (L side) and rhonchi (L side) present.   Abdominal:      General: Bowel sounds are normal. There is no distension.      Palpations: Abdomen is soft.      Tenderness: There is no abdominal tenderness.   Musculoskeletal:         General: No tenderness. Normal range of motion.      Cervical back: Normal range of motion and neck supple.   Lymphadenopathy:      Cervical: No cervical adenopathy.   Skin:     General: Skin is warm and dry.      Capillary Refill: Capillary refill takes less than 2 seconds.      Findings: No rash.   Neurological:      Mental Status: He is alert and oriented to person, place, and time.      Cranial Nerves: No cranial nerve deficit.      Sensory: No sensory deficit.      Coordination: Coordination normal.   Psychiatric:         Behavior: Behavior normal.         Thought Content: Thought content normal.         Judgment: Judgment normal.       Significant Labs: All pertinent labs within the past 24 hours have been reviewed.  CBC:   Recent Labs   Lab 10/20/22  1202 10/21/22  0248   WBC 8.84 6.45   HGB 13.8* 11.7*   HCT 39.5* 33.5*    156     CMP:   Recent Labs   Lab 10/20/22  1202 10/21/22  0248   * 135*   K 2.9* 3.1*   CL 94* 101   CO2 26 23    93   BUN 17 12   CREATININE 1.5* 0.9   CALCIUM 10.1 8.8   PROT 9.2* 6.9   ALBUMIN 4.2 3.1*   BILITOT 1.1* 0.7   ALKPHOS 71 51*   AST 61* 50*   ALT 39 34   ANIONGAP 12 11       Significant Imaging: I have reviewed all pertinent imaging results/findings within the past 24 hours.      Assessment/Plan:      *  Pneumonia  - reports cough, congestion, night sweats, nausea, diarrhea for several days  - Taken Azithromycin/Augmentin x 3 days with worsening of symptoms  - SIRS 2/4 on arrival: HR >90, RR >20  - WBC 8.84, Lactic 1.9  - Covid/flu negative   - viral respiratory panel pending  - higher suspicion for legionella with n/v/d, hyponatremia, and elevated AST  - legionella and respiratory culture pending  - blood culture pending  - CXR with abnormal chest radiograph demonstrating airspace consolidation in the mid and lower lung zones on the left, consistent with acute pneumonia  - Given Levo 750mg PO x1 and 1L IVF in the ED  - given additional 1.5L IVF for sepsis protocol  - Started on Levo 750mg IV q24h and Vancomycin  - scheduled tylenol  - mucinex, benzonate, and promethazine-codeine prn for symptomatic tx    Diarrhea  Reporting 3-5 watery stools/day for the past 7-10 days  - possibly 2/2 viral gastroenteritis vs. legionnaires disease vs. c diff  - legionella culture pending  - will order c diff precautions and stool studies; c diff antigens and toxins negative    Hyponatremia  - Na 132 on admit  - likely 2/2 volume depletion  - IVF  - Repeat metabolic panel to monitor for response    Hypokalemia  Patient has hypokalemia which is currently uncontrolled. Last electrolytes reviewed-   Recent Labs   Lab 10/20/22  1202 10/21/22  0248   K 2.9* 3.1*   Will replace potassium and monitor electrolytes closely. Continuous telemetry.    VTE Risk Mitigation (From admission, onward)         Ordered     enoxaparin injection 40 mg  Daily         10/20/22 1351     IP VTE HIGH RISK PATIENT  Once         10/20/22 1351     Place sequential compression device  Until discontinued         10/20/22 1351                Discharge Planning   PAULETTE: 10/22/2022     Code Status: Full Code   Is the patient medically ready for discharge?: No    Reason for patient still in hospital (select all that apply): Patient trending condition and Treatment                      Ramona Doshi PA-C  Department of Hospital Medicine   [facility] - Intensive Care ([place])

## 2022-10-21 NOTE — ASSESSMENT & PLAN NOTE
Reporting 3-5 watery stools/day for the past 7-10 days  - possibly 2/2 viral gastroenteritis vs. legionnaires disease vs. c diff  - legionella culture pending  - will order c diff precautions and stool studies; c diff antigens and toxins negative

## 2022-10-21 NOTE — ASSESSMENT & PLAN NOTE
- reports cough, congestion, night sweats, nausea, diarrhea for several days  - Taken Azithromycin/Augmentin x 3 days with worsening of symptoms  - SIRS 2/4 on arrival: HR >90, RR >20  - WBC 8.84, Lactic 1.9  - Covid/flu negative   - viral respiratory panel pending  - higher suspicion for legionella with n/v/d, hyponatremia, and elevated AST  - legionella and respiratory culture pending  - blood culture pending  - CXR with abnormal chest radiograph demonstrating airspace consolidation in the mid and lower lung zones on the left, consistent with acute pneumonia  - Given Levo 750mg PO x1 and 1L IVF in the ED  - given additional 1.5L IVF for sepsis protocol  - Started on Levo 750mg IV q24h and Vancomycin  - scheduled tylenol  - mucinex, benzonate, and promethazine-codeine prn for symptomatic tx

## 2022-10-21 NOTE — ED NOTES
War Room called:  Tele Box: #[phone]    Per war room, showing a lot of artifact. Patient has a very hairy chest.

## 2022-10-21 NOTE — H&P
[facility] - Emergency Dept  [facility] Medicine  History & Physical    Patient Name: Germain Parrish  MRN: [id number]  Patient Class: IP- Inpatient  Admission Date: 10/20/2022  Attending Physician: Lore Harmon MD   Primary Care Provider: Ramirez Edwards MD         Patient information was obtained from patient, spouse/SO and ER records.     Subjective:     Principal Problem:Pneumonia    Chief Complaint: No chief complaint on file.       HPI: Germain Parrish is a 32 y.o. male with PMHx significant for depression and gout admitted to hospital medicine for L lobe PNA that has failed outpatient therapy. Patient reports productive cough, congestion and shortness of breath with associated intermittent nausea and diarrhea for the last week.  States that he was diagnosed with pneumonia at an  3 days ago, and was started on Azithromycin and Augmentin. Reports he has been compliant with both medications, however his symptoms have been worsening. Endorses night sweats the last 2 days, but denies fevers at home (T max 99). Reports 3-5 watery BMs per day. Wife states that their kids first had n/v/d about 10 days ago and their symptoms have resolved. Denies HA, blurred vision, CP, vomiting, dysuria, numbness/tingling, weakness.     In the ED, patient is tachycardic to max 134. SpO2 >94% on RA. WBC 8.84. Lactic 1.9. Na 132. K 2.9. Cr 1.5. Covid and flu negative. CXR with abnormal chest radiograph demonstrating airspace consolidation in the mid and lower lung zones on the left, consistent with acute pneumonia. Given Levo PO x1, Potassium bicarb 40mEq x1, and 1L IVF.       Past Medical History:   Diagnosis Date    Ankle sprain     Anxiety     Depression     Migraine        Past Surgical History:   Procedure Laterality Date    CYST REMOVAL         Review of patient's allergies indicates:   Allergen Reactions    Lactose        No current facility-administered medications on file prior to encounter.     Current Outpatient  Medications on File Prior to Encounter   Medication Sig    sertraline (ZOLOFT) 100 MG tablet Take 1 tablet (100 mg total) by mouth once daily.    colchicine (COLCRYS) 0.6 mg tablet Take 1 tablet (0.6 mg total) by mouth once daily. for 3 days    sumatriptan (IMITREX) 50 MG tablet Take 1 tablet (50 mg total) by mouth every 2 (two) hours as needed for Migraine (Do not exceed 2 doses in 24 hour period). (Patient not taking: Reported on 7/14/2021)    ubrogepant (UBRELVY)tablet 100 mg Take 1 tablet (100 mg total) by mouth daily as needed for Migraine.     Family History       Problem Relation (Age of Onset)    No Known Problems Mother, Father          Tobacco Use    Smoking status: Never    Smokeless tobacco: Never   Substance and Sexual Activity    Alcohol use: Yes    Drug use: Not Currently    Sexual activity: Yes     Partners: Female     Review of Systems   Constitutional:  Positive for chills and fatigue. Negative for activity change and fever.   HENT:  Negative for trouble swallowing.    Eyes:  Negative for photophobia and visual disturbance.   Respiratory:  Positive for cough and shortness of breath. Negative for chest tightness and wheezing.    Cardiovascular:  Negative for chest pain, palpitations and leg swelling.   Gastrointestinal:  Positive for abdominal pain, diarrhea, nausea and vomiting. Negative for constipation.   Genitourinary:  Negative for dysuria, frequency, hematuria and urgency.   Musculoskeletal:  Negative for arthralgias, back pain and gait problem.   Skin:  Negative for color change and rash.   Neurological:  Negative for dizziness, syncope, weakness, light-headedness, numbness and headaches.   Psychiatric/Behavioral:  Negative for agitation and confusion. The patient is not nervous/anxious.    Objective:     Vital Signs (Most Recent):  Temp: 98.5 °F (36.9 °C) (10/20/22 1031)  Pulse: 109 (10/20/22 1630)  Resp: 15 (10/20/22 1630)  BP: 111/67 (10/20/22 1630)  SpO2: 95 % (10/20/22 1630)  Vital Signs (24h Range):  Temp:  [98.5 °F (36.9 °C)] 98.5 °F (36.9 °C)  Pulse:  [109-134] 109  Resp:  [14-26] 15  SpO2:  [94 %-97 %] 95 %  BP: (111-126)/(67-78) 111/67     Weight: 91.6 kg (202 lb)  Body mass index is 31.64 kg/m².    Physical Exam  Vitals and nursing note reviewed.   Constitutional:       General: He is not in acute distress.     Appearance: He is well-developed. He is ill-appearing. He is not diaphoretic.   HENT:      Head: Normocephalic and atraumatic.      Mouth/Throat:      Pharynx: No oropharyngeal exudate.   Eyes:      Extraocular Movements: Extraocular movements intact.      Conjunctiva/sclera: Conjunctivae normal.      Pupils: Pupils are equal, round, and reactive to light.   Cardiovascular:      Rate and Rhythm: Normal rate and regular rhythm.      Heart sounds: Normal heart sounds.   Pulmonary:      Effort: Pulmonary effort is normal. No respiratory distress.      Breath sounds: Wheezing (L side) and rhonchi (L side) present.   Abdominal:      General: Bowel sounds are normal. There is no distension.      Palpations: Abdomen is soft.      Tenderness: There is no abdominal tenderness.   Musculoskeletal:         General: No tenderness. Normal range of motion.      Cervical back: Normal range of motion and neck supple.   Lymphadenopathy:      Cervical: No cervical adenopathy.   Skin:     General: Skin is warm and dry.      Capillary Refill: Capillary refill takes less than 2 seconds.      Findings: No rash.   Neurological:      Mental Status: He is alert and oriented to person, place, and time.      Cranial Nerves: No cranial nerve deficit.      Sensory: No sensory deficit.      Coordination: Coordination normal.   Psychiatric:         Behavior: Behavior normal.         Thought Content: Thought content normal.         Judgment: Judgment normal.         CRANIAL NERVES     CN III, IV, VI   Pupils are equal, round, and reactive to light.     Significant Labs: All pertinent labs within the past 24  hours have been reviewed.  CBC:   Recent Labs   Lab 10/20/22  1202   WBC 8.84   HGB 13.8*   HCT 39.5*        CMP:   Recent Labs   Lab 10/20/22  1202   *   K 2.9*   CL 94*   CO2 26      BUN 17   CREATININE 1.5*   CALCIUM 10.1   PROT 9.2*   ALBUMIN 4.2   BILITOT 1.1*   ALKPHOS 71   AST 61*   ALT 39   ANIONGAP 12     Lactic Acid:   Recent Labs   Lab 10/20/22  1202   LACTATE 1.9       Significant Imaging: I have reviewed all pertinent imaging results/findings within the past 24 hours.    Assessment/Plan:     * Pneumonia  - reports cough, congestion, night sweats, nausea, diarrhea for several days  - Taken Azithromycin/Augmentin x 3 days with worsening of symptoms  - SIRS 2/4 on arrival: HR >90, RR >20  - WBC 8.84, Lactic 1.9  - Covid/flu negative   - viral respiratory panel pending  - higher suspicion for legionella with n/v/d, hyponatremia, and elevated AST  - legionella and respiratory culture pending  - blood culture pending  - CXR with abnormal chest radiograph demonstrating airspace consolidation in the mid and lower lung zones on the left, consistent with acute pneumonia  - Given Levo 750mg PO x1 and 1L IVF in the ED  - will give additional 1.5L IVF for sepsis protocol  - Started on Levo 750mg IV q24h and Vancomycin  - mucinex and benzonate for symptomatic tx    Diarrhea  Reporting 3-5 watery stools/day for the past 7-10 days  - possibly 2/2 viral gastroenteritis vs. legionnaires disease vs. c diff  - legionella culture pending  - will order c diff precautions and stool studies    Hyponatremia  - Na 132  - likely 2/2 volume depletion  - IVF  - Repeat metabolic panel to monitor for response    Hypokalemia  Patient has hypokalemia which is currently uncontrolled. Last electrolytes reviewed- Recent Labs   Lab 10/20/22  1202   K 2.9*   . Will replace potassium and monitor electrolytes closely. Continuous telemetry.      VTE Risk Mitigation (From admission, onward)         Ordered     enoxaparin  injection 40 mg  Daily         10/20/22 1351     IP VTE HIGH RISK PATIENT  Once         10/20/22 1351     Place sequential compression device  Until discontinued         10/20/22 1351                   Ramona Doshi PA-C  Department of Hospital Medicine   [facility] Dept

## 2022-10-21 NOTE — HOSPITAL COURSE
Germain Parrish is a 33yo M admitted to hospital medicine for further management of L lobe PNA. Started on IV levo 750 mg and vanc and given IVF. Blood cx with NGTD x2 days. Respiratory viral panel positive for adenovirus. Resp culture and legionella pending. Reporting 3-5 watery BMs for the past ~10 days, C diff negative. Patient feeling better overall and with good po intake. Vital signs now stable; patient afebrile throughout admission. Transitioned to po levofloxacin on discharge. Discharged home with family with strict return precautions.

## 2022-10-21 NOTE — SUBJECTIVE & OBJECTIVE
Interval History: Tachycardic overnight, but otherwise VSSAF. Reports 2 watery BMs overnight, stool studies collected. Reports difficulty sleeping 2/2 cough. Added on promethazine-codeine prn and scheduled tylenol. K 3.1; repleted.     Review of Systems   Constitutional:  Positive for chills and fatigue. Negative for activity change and fever.   HENT:  Negative for trouble swallowing.    Eyes:  Negative for photophobia and visual disturbance.   Respiratory:  Positive for cough and shortness of breath. Negative for chest tightness and wheezing.    Cardiovascular:  Negative for chest pain, palpitations and leg swelling.   Gastrointestinal:  Positive for abdominal pain, diarrhea, nausea and vomiting. Negative for constipation.   Genitourinary:  Negative for dysuria, frequency, hematuria and urgency.   Musculoskeletal:  Negative for arthralgias, back pain and gait problem.   Skin:  Negative for color change and rash.   Neurological:  Negative for dizziness, syncope, weakness, light-headedness, numbness and headaches.   Psychiatric/Behavioral:  Negative for agitation and confusion. The patient is not nervous/anxious.    Objective:     Vital Signs (Most Recent):  Temp: 97.9 °F (36.6 °C) (10/21/22 0413)  Pulse: 106 (10/21/22 0413)  Resp: 19 (10/21/22 0413)  BP: 124/69 (10/21/22 0413)  SpO2: 99 % (10/21/22 0413) Vital Signs (24h Range):  Temp:  [97.9 °F (36.6 °C)-98.6 °F (37 °C)] 97.9 °F (36.6 °C)  Pulse:  [] 106  Resp:  [13-26] 19  SpO2:  [94 %-99 %] 99 %  BP: (100-130)/(60-78) 124/69     Weight: 87.9 kg (193 lb 12.6 oz)  Body mass index is 30.35 kg/m².    Intake/Output Summary (Last 24 hours) at 10/21/2022 9644  Last data filed at 10/20/2022 1929  Gross per 24 hour   Intake 2250 ml   Output --   Net 2250 ml      Physical Exam  Vitals and nursing note reviewed.   Constitutional:       General: He is not in acute distress.     Appearance: He is well-developed. He is ill-appearing. He is not diaphoretic.   HENT:       Head: Normocephalic and atraumatic.      Mouth/Throat:      Pharynx: No oropharyngeal exudate.   Eyes:      Extraocular Movements: Extraocular movements intact.      Conjunctiva/sclera: Conjunctivae normal.      Pupils: Pupils are equal, round, and reactive to light.   Cardiovascular:      Rate and Rhythm: Normal rate and regular rhythm.      Heart sounds: Normal heart sounds.   Pulmonary:      Effort: Pulmonary effort is normal. No respiratory distress.      Breath sounds: Wheezing (L side) and rhonchi (L side) present.   Abdominal:      General: Bowel sounds are normal. There is no distension.      Palpations: Abdomen is soft.      Tenderness: There is no abdominal tenderness.   Musculoskeletal:         General: No tenderness. Normal range of motion.      Cervical back: Normal range of motion and neck supple.   Lymphadenopathy:      Cervical: No cervical adenopathy.   Skin:     General: Skin is warm and dry.      Capillary Refill: Capillary refill takes less than 2 seconds.      Findings: No rash.   Neurological:      Mental Status: He is alert and oriented to person, place, and time.      Cranial Nerves: No cranial nerve deficit.      Sensory: No sensory deficit.      Coordination: Coordination normal.   Psychiatric:         Behavior: Behavior normal.         Thought Content: Thought content normal.         Judgment: Judgment normal.       Significant Labs: All pertinent labs within the past 24 hours have been reviewed.  CBC:   Recent Labs   Lab 10/20/22  1202 10/21/22  0248   WBC 8.84 6.45   HGB 13.8* 11.7*   HCT 39.5* 33.5*    156     CMP:   Recent Labs   Lab 10/20/22  1202 10/21/22  0248   * 135*   K 2.9* 3.1*   CL 94* 101   CO2 26 23    93   BUN 17 12   CREATININE 1.5* 0.9   CALCIUM 10.1 8.8   PROT 9.2* 6.9   ALBUMIN 4.2 3.1*   BILITOT 1.1* 0.7   ALKPHOS 71 51*   AST 61* 50*   ALT 39 34   ANIONGAP 12 11       Significant Imaging: I have reviewed all pertinent imaging results/findings  within the past 24 hours.

## 2022-10-22 VITALS
WEIGHT: 193.81 LBS | SYSTOLIC BLOOD PRESSURE: 119 MMHG | BODY MASS INDEX: 30.42 KG/M2 | RESPIRATION RATE: 18 BRPM | DIASTOLIC BLOOD PRESSURE: 69 MMHG | TEMPERATURE: 99 F | OXYGEN SATURATION: 94 % | HEIGHT: 67 IN | HEART RATE: 94 BPM

## 2022-10-22 LAB
ADENOVIRUS: DETECTED
ALBUMIN SERPL BCP-MCNC: 2.9 G/DL (ref 3.5–5.2)
ALP SERPL-CCNC: 49 U/L (ref 55–135)
ALT SERPL W/O P-5'-P-CCNC: 30 U/L (ref 10–44)
ANION GAP SERPL CALC-SCNC: 10 MMOL/L (ref 8–16)
ANISOCYTOSIS BLD QL SMEAR: SLIGHT
AST SERPL-CCNC: 39 U/L (ref 10–40)
BASOPHILS NFR BLD: 1 % (ref 0–1.9)
BILIRUB SERPL-MCNC: 0.4 MG/DL (ref 0.1–1)
BORDETELLA PARAPERTUSSIS (IS1001): NOT DETECTED
BORDETELLA PERTUSSIS (PTXP): NOT DETECTED
BUN SERPL-MCNC: 6 MG/DL (ref 6–20)
CALCIUM SERPL-MCNC: 8.8 MG/DL (ref 8.7–10.5)
CHLAMYDIA PNEUMONIAE: NOT DETECTED
CHLORIDE SERPL-SCNC: 103 MMOL/L (ref 95–110)
CO2 SERPL-SCNC: 23 MMOL/L (ref 23–29)
CORONAVIRUS 229E, COMMON COLD VIRUS: NOT DETECTED
CORONAVIRUS HKU1, COMMON COLD VIRUS: NOT DETECTED
CORONAVIRUS NL63, COMMON COLD VIRUS: NOT DETECTED
CORONAVIRUS OC43, COMMON COLD VIRUS: NOT DETECTED
CREAT SERPL-MCNC: 0.9 MG/DL (ref 0.5–1.4)
DIFFERENTIAL METHOD: ABNORMAL
EOSINOPHIL NFR BLD: 8 % (ref 0–8)
ERYTHROCYTE [DISTWIDTH] IN BLOOD BY AUTOMATED COUNT: 13 % (ref 11.5–14.5)
EST. GFR  (NO RACE VARIABLE): >60 ML/MIN/1.73 M^2
FLUBV RNA NPH QL NAA+NON-PROBE: NOT DETECTED
GLUCOSE SERPL-MCNC: 94 MG/DL (ref 70–110)
HCT VFR BLD AUTO: 33.1 % (ref 40–54)
HGB BLD-MCNC: 11.2 G/DL (ref 14–18)
HPIV1 RNA NPH QL NAA+NON-PROBE: NOT DETECTED
HPIV2 RNA NPH QL NAA+NON-PROBE: NOT DETECTED
HPIV3 RNA NPH QL NAA+NON-PROBE: NOT DETECTED
HPIV4 RNA NPH QL NAA+NON-PROBE: NOT DETECTED
HUMAN METAPNEUMOVIRUS: NOT DETECTED
IMM GRANULOCYTES # BLD AUTO: ABNORMAL K/UL (ref 0–0.04)
IMM GRANULOCYTES NFR BLD AUTO: ABNORMAL % (ref 0–0.5)
INFLUENZA A (SUBTYPES H1,H1-2009,H3): NOT DETECTED
LYMPHOCYTES NFR BLD: 30 % (ref 18–48)
MAGNESIUM SERPL-MCNC: 2.1 MG/DL (ref 1.6–2.6)
MCH RBC QN AUTO: 27.7 PG (ref 27–31)
MCHC RBC AUTO-ENTMCNC: 33.8 G/DL (ref 32–36)
MCV RBC AUTO: 82 FL (ref 82–98)
MONOCYTES NFR BLD: 4 % (ref 4–15)
MYCOPLASMA PNEUMONIAE: NOT DETECTED
NEUTROPHILS NFR BLD: 57 % (ref 38–73)
NRBC BLD-RTO: 0 /100 WBC
PLATELET # BLD AUTO: 191 K/UL (ref 150–450)
PLATELET BLD QL SMEAR: ABNORMAL
PMV BLD AUTO: 10.3 FL (ref 9.2–12.9)
POTASSIUM SERPL-SCNC: 3.6 MMOL/L (ref 3.5–5.1)
PROT SERPL-MCNC: 6.7 G/DL (ref 6–8.4)
RBC # BLD AUTO: 4.04 M/UL (ref 4.6–6.2)
RESPIRATORY INFECTION PANEL SOURCE: ABNORMAL
RSV RNA NPH QL NAA+NON-PROBE: NOT DETECTED
RV+EV RNA NPH QL NAA+NON-PROBE: NOT DETECTED
SARS-COV-2 RNA RESP QL NAA+PROBE: NOT DETECTED
SODIUM SERPL-SCNC: 136 MMOL/L (ref 136–145)
WBC # BLD AUTO: 5.7 K/UL (ref 3.9–12.7)

## 2022-10-22 PROCEDURE — 80053 COMPREHEN METABOLIC PANEL: CPT | Performed by: PHYSICIAN ASSISTANT

## 2022-10-22 PROCEDURE — 25000003 PHARM REV CODE 250: Performed by: PHYSICIAN ASSISTANT

## 2022-10-22 PROCEDURE — 99239 HOSP IP/OBS DSCHRG MGMT >30: CPT | Mod: ,,,

## 2022-10-22 PROCEDURE — 83735 ASSAY OF MAGNESIUM: CPT | Performed by: PHYSICIAN ASSISTANT

## 2022-10-22 PROCEDURE — 36415 COLL VENOUS BLD VENIPUNCTURE: CPT | Performed by: PHYSICIAN ASSISTANT

## 2022-10-22 PROCEDURE — 63600175 PHARM REV CODE 636 W HCPCS: Performed by: HOSPITALIST

## 2022-10-22 PROCEDURE — 85007 BL SMEAR W/DIFF WBC COUNT: CPT | Performed by: PHYSICIAN ASSISTANT

## 2022-10-22 PROCEDURE — 85027 COMPLETE CBC AUTOMATED: CPT | Performed by: PHYSICIAN ASSISTANT

## 2022-10-22 PROCEDURE — 25000003 PHARM REV CODE 250: Performed by: HOSPITALIST

## 2022-10-22 PROCEDURE — 99239 PR HOSPITAL DISCHARGE DAY,>30 MIN: ICD-10-PCS | Mod: ,,,

## 2022-10-22 PROCEDURE — 25000003 PHARM REV CODE 250

## 2022-10-22 RX ORDER — LEVOFLOXACIN 500 MG/1
500 TABLET, FILM COATED ORAL DAILY
Qty: 7 TABLET | Refills: 0 | Status: SHIPPED | OUTPATIENT
Start: 2022-10-22 | End: 2022-10-29

## 2022-10-22 RX ORDER — LEVOFLOXACIN 750 MG/1
750 TABLET ORAL DAILY
Status: DISCONTINUED | OUTPATIENT
Start: 2022-10-22 | End: 2022-10-22 | Stop reason: HOSPADM

## 2022-10-22 RX ORDER — PROMETHAZINE HYDROCHLORIDE AND CODEINE PHOSPHATE 6.25; 1 MG/5ML; MG/5ML
5 SOLUTION ORAL EVERY 4 HOURS PRN
Qty: 118 ML | Refills: 0 | Status: SHIPPED | OUTPATIENT
Start: 2022-10-22 | End: 2022-10-27

## 2022-10-22 RX ORDER — BENZONATATE 100 MG/1
100 CAPSULE ORAL 3 TIMES DAILY PRN
Qty: 21 CAPSULE | Refills: 0 | Status: SHIPPED | OUTPATIENT
Start: 2022-10-22 | End: 2022-10-29

## 2022-10-22 RX ADMIN — PROMETHAZINE HYDROCHLORIDE AND CODEINE PHOSPHATE 5 ML: 10; 6.25 SOLUTION ORAL at 09:10

## 2022-10-22 RX ADMIN — ACETAMINOPHEN 1000 MG: 500 TABLET ORAL at 08:10

## 2022-10-22 RX ADMIN — PROMETHAZINE HYDROCHLORIDE AND CODEINE PHOSPHATE 5 ML: 10; 6.25 SOLUTION ORAL at 12:10

## 2022-10-22 RX ADMIN — VANCOMYCIN HYDROCHLORIDE 1500 MG: 1.5 INJECTION, POWDER, LYOPHILIZED, FOR SOLUTION INTRAVENOUS at 08:10

## 2022-10-22 RX ADMIN — BENZONATATE 100 MG: 100 CAPSULE ORAL at 08:10

## 2022-10-22 RX ADMIN — SERTRALINE HYDROCHLORIDE 100 MG: 25 TABLET ORAL at 08:10

## 2022-10-22 NOTE — ASSESSMENT & PLAN NOTE
Patient has hypokalemia which is currently uncontrolled. Last electrolytes reviewed-   Recent Labs   Lab 10/20/22  1202 10/21/22  0248 10/22/22  0541   K 2.9* 3.1* 3.6   Will replace potassium and monitor electrolytes closely. Continuous telemetry.

## 2022-10-22 NOTE — ASSESSMENT & PLAN NOTE
Reporting 3-5 watery stools/day for the past 7-10 days  - possibly 2/2 viral gastroenteritis vs. legionnaires disease  - legionella culture pending  - will order c diff precautions and stool studies; c diff antigens and toxins negative  - reports decreased watery BMs overnight on day of discharge

## 2022-10-22 NOTE — PLAN OF CARE
Pt is AAOx4,VS WNL on room air. Pt with orders to d/c IV and d/c home. Tolerated d/c of Iv. Awake ,alert, and oriented with no acute distress noted . Reviewed discharge orders including : medicine orders, prescriptions, followup appts, and patient education materials for diet and diagnosis. Belongings packed for transport  to home. Ambulating out at discharge with wife

## 2022-10-22 NOTE — PROGRESS NOTES
Pharmacist Intervention IV to PO Note    Germain Parrish is a 32 y.o. male being treated with IV medication levofloxacin    Patient Data:    Vital Signs (Most Recent):  Temp: 98.5 °F (36.9 °C) (10/22/22 0955)  Pulse: 94 (10/22/22 0955)  Resp: 18 (10/22/22 0955)  BP: 119/69 (10/22/22 0955)  SpO2: (!) 94 % (10/22/22 0955)   Vital Signs (72h Range):  Temp:  [97.9 °F (36.6 °C)-98.6 °F (37 °C)]   Pulse:  []   Resp:  [13-26]   BP: (100-130)/(60-86)   SpO2:  [94 %-99 %]      CBC:  Recent Labs   Lab 10/20/22  1202 10/21/22  0248 10/22/22  0541   WBC 8.84 6.45 5.70   RBC 4.96 4.20* 4.04*   HGB 13.8* 11.7* 11.2*   HCT 39.5* 33.5* 33.1*    156 191   MCV 80* 80* 82   MCH 27.8 27.9 27.7   MCHC 34.9 34.9 33.8     CMP:     Recent Labs   Lab 10/20/22  1202 10/21/22  0248 10/22/22  0541    93 94   CALCIUM 10.1 8.8 8.8   ALBUMIN 4.2 3.1* 2.9*   PROT 9.2* 6.9 6.7   * 135* 136   K 2.9* 3.1* 3.6   CO2 26 23 23   CL 94* 101 103   BUN 17 12 6   CREATININE 1.5* 0.9 0.9   ALKPHOS 71 51* 49*   ALT 39 34 30   AST 61* 50* 39   BILITOT 1.1* 0.7 0.4       Dietary Orders:  Diet Orders            Diet Adult Regular (IDDSI Level 7): Regular starting at 10/20 1350            Based on the following criteria, this patient qualifies for intravenous to oral conversion:  [x] The patients gastrointestinal tract is functioning (tolerating medications via oral or enteral route for 24 hours and tolerating food or enteral feeds for 24 hours).  [x] The patient is hemodynamically stable for 24 hours (heart rate <100 beats per minute, systolic blood pressure >99 mm Hg, and respiratory rate <20 breaths per minute).  [x] The patient shows clinical improvement (afebrile for at least 24 hours and white blood cell count downtrending or normalized). Additionally, the patient must be non-neutropenic (absolute neutrophil count >500 cells/mm3).  [x] For antimicrobials, the patient has received IV therapy for at least 24 hours.    IV  medication levofloxacin will be changed to oral medication levofloxacin    Pharmacist's Name: Ludin Mcallister  Pharmacist's Extension: [phone]

## 2022-10-22 NOTE — ASSESSMENT & PLAN NOTE
- reports cough, congestion, night sweats, nausea, diarrhea for several days  - Taken Azithromycin/Augmentin x 3 days with worsening of symptoms  - SIRS 2/4 on arrival: HR >90, RR >20  - WBC 8.84, Lactic 1.9  - Covid/flu negative   - viral respiratory panel positive for adenovirus  - higher suspicion for legionella with n/v/d, hyponatremia, and elevated AST  - legionella and respiratory culture pending  - blood culture with NGTD x2days  - c diff negative  - CXR with abnormal chest radiograph demonstrating airspace consolidation in the mid and lower lung zones on the left, consistent with acute pneumonia  - Given Levo 750mg PO x1 and 1L IVF in the ED  - given additional 1.5L IVF for sepsis protocol  - given additional IVF on 10/21  - Started on Levo 750mg IV q24h and Vancomycin; transitioned to po levo 500mg BID on discharge  - scheduled tylenol  - mucinex, benzonate, and promethazine-codeine prn for symptomatic tx

## 2022-10-25 ENCOUNTER — PATIENT OUTREACH (OUTPATIENT)
Dept: ADMINISTRATIVE | Facility: CLINIC | Age: 32
End: 2022-10-25
Payer: COMMERCIAL

## 2022-10-25 ENCOUNTER — PATIENT MESSAGE (OUTPATIENT)
Dept: INTERNAL MEDICINE | Facility: CLINIC | Age: 32
End: 2022-10-25
Payer: COMMERCIAL

## 2022-10-25 DIAGNOSIS — J18.9 PNEUMONIA DUE TO INFECTIOUS ORGANISM, UNSPECIFIED LATERALITY, UNSPECIFIED PART OF LUNG: Primary | ICD-10-CM

## 2022-10-25 LAB
BACTERIA BLD CULT: NORMAL
BACTERIA BLD CULT: NORMAL

## 2022-10-25 NOTE — TELEPHONE ENCOUNTER
"Throat & Urine Culture/Samples - "processing," message sent to HM for evaluation and to consult w/ patient regarding concerns.   "

## 2022-10-25 NOTE — PROGRESS NOTES
C3 nurse spoke with Germain Parrish for a TCC post hospital discharge follow up call. The patient does not have a scheduled HOSFU appointment with Ramirez Edwards MD within 5-7 days post hospital discharge date 10/22/2022. C3 nurse was unable to schedule HOSFU appointment in [facility].    Message sent to PCP staff requesting they contact patient and schedule follow up appointment.     Home NP referral placed per patient's request.

## 2022-10-26 NOTE — DISCHARGE SUMMARY
[facility] - Intensive Care ([place] [id number])  [facility] Medicine  Discharge Summary      Patient Name: Germain Parrish  MRN: [id number]  Patient Class: IP- Inpatient  Admission Date: 10/20/2022  Hospital Length of Stay: 2 days  Discharge Date and Time: 10/22/2022  4:14 PM  Attending Physician: No att. providers found   Discharging Provider: Ramona Doshi PA-C  Primary Care Provider: Ramirez Edwards MD  [facility] Medicine Team: [facility] Ramona Doshi PA-C    HPI:   Germain Parrish is a 32 y.o. male with PMHx significant for depression and gout admitted to hospital medicine for L lobe PNA that has failed outpatient therapy. Patient reports productive cough, congestion and shortness of breath with associated intermittent nausea and diarrhea for the last week.  States that he was diagnosed with pneumonia at an  3 days ago, and was started on Azithromycin and Augmentin. Reports he has been compliant with both medications, however his symptoms have been worsening. Endorses night sweats the last 2 days, but denies fevers at home (T max 99). Reports 3-5 watery BMs per day. Wife states that their kids first had n/v/d about 10 days ago and their symptoms have resolved. Denies HA, blurred vision, CP, vomiting, dysuria, numbness/tingling, weakness.     In the ED, patient is tachycardic to max 134. SpO2 >94% on RA. WBC 8.84. Lactic 1.9. Na 132. K 2.9. Cr 1.5. Covid and flu negative. CXR with abnormal chest radiograph demonstrating airspace consolidation in the mid and lower lung zones on the left, consistent with acute pneumonia. Given Levo PO x1, Potassium bicarb 40mEq x1, and 1L IVF.       * No surgery found *      Hospital Course:   Germain Parrish is a 33yo M admitted to hospital medicine for further management of L lobe PNA. Started on IV levo 750 mg and vanc and given IVF. Blood cx with NGTD x2 days. Respiratory viral panel positive for adenovirus. Resp culture and legionella pending. Reporting 3-5 watery BMs  for the past ~10 days, C diff negative. Patient feeling better overall and with good po intake. Vital signs now stable; patient afebrile throughout admission. Transitioned to po levofloxacin on discharge. Discharged home with family with strict return precautions.        Goals of Care Treatment Preferences:  Code Status: Full Code      * Pneumonia  - reports cough, congestion, night sweats, nausea, diarrhea for several days  - Taken Azithromycin/Augmentin x 3 days with worsening of symptoms  - SIRS 2/4 on arrival: HR >90, RR >20  - WBC 8.84, Lactic 1.9  - Covid/flu negative   - viral respiratory panel positive for adenovirus  - higher suspicion for legionella with n/v/d, hyponatremia, and elevated AST  - legionella and respiratory culture pending  - blood culture with NGTD x2days  - c diff negative  - CXR with abnormal chest radiograph demonstrating airspace consolidation in the mid and lower lung zones on the left, consistent with acute pneumonia  - Given Levo 750mg PO x1 and 1L IVF in the ED  - given additional 1.5L IVF for sepsis protocol  - given additional IVF on 10/21  - Started on Levo 750mg IV q24h and Vancomycin; transitioned to po levo 500mg BID on discharge  - scheduled tylenol  - mucinex, benzonate, and promethazine-codeine prn for symptomatic tx      Final Active Diagnoses:    Diagnosis Date Noted POA    PRINCIPAL PROBLEM:  Pneumonia [J18.9] 10/20/2022 Yes    Hypokalemia [E87.6] 10/20/2022 Yes    Hyponatremia [E87.1] 10/20/2022 Yes    Diarrhea [R19.7] 10/20/2022 Yes      Problems Resolved During this Admission:       Discharged Condition: stable    Disposition: Home or Self Care    Follow Up:    Patient Instructions:      Diet Adult Regular     Notify your health care provider if you experience any of the following:  temperature >100.4     Notify your health care provider if you experience any of the following:  persistent nausea and vomiting or diarrhea     Notify your health care provider if you  experience any of the following:  difficulty breathing or increased cough     Notify your health care provider if you experience any of the following:  severe persistent headache     Notify your health care provider if you experience any of the following:  increased confusion or weakness     Activity as tolerated       Pending Diagnostic Studies:     Procedure Component Value Units Date/Time    Brain natriuretic peptide [[id number]] Collected: 10/20/22 1209    Order Status: Sent Lab Status: In process Updated: 10/20/22 1210    Specimen: Blood     Legionella antigen, urine [[id number]] Collected: 10/20/22 1130    Order Status: Sent Lab Status: In process Updated: 10/20/22 1130    Specimen: Urine, Clean Catch     Troponin I [[id number]] Collected: 10/20/22 1209    Order Status: Sent Lab Status: In process Updated: 10/20/22 1210    Specimen: Blood          Medications:  Reconciled Home Medications:      Medication List      START taking these medications    benzonatate 100 MG capsule  Commonly known as: TESSALON  Take 1 capsule (100 mg total) by mouth 3 (three) times daily as needed for Cough.     levoFLOXacin 500 MG tablet  Commonly known as: LEVAQUIN  Take 1 tablet (500 mg total) by mouth once daily. for 7 days     * promethazine-codeine 6.25-10 mg/5 ml 6.25-10 mg/5 mL syrup  Commonly known as: PHENERGAN with CODEINE  Take 5 mLs by mouth every 4 (four) hours as needed for Cough.     * promethazine-codeine 6.25-10 mg/5 ml 6.25-10 mg/5 mL syrup  Commonly known as: PHENERGAN with CODEINE  Take 5 mLs by mouth every 4 (four) hours as needed for Cough.         * This list has 2 medication(s) that are the same as other medications prescribed for you. Read the directions carefully, and ask your doctor or other care provider to review them with you.            CONTINUE taking these medications    colchicine 0.6 mg tablet  Commonly known as: COLCRYS  Take 1 tablet (0.6 mg total) by mouth once daily. for 3 days     sertraline 100  MG tablet  Commonly known as: ZOLOFT  Take 1 tablet (100 mg total) by mouth once daily.     sumatriptan 50 MG tablet  Commonly known as: IMITREX  Take 1 tablet (50 mg total) by mouth every 2 (two) hours as needed for Migraine (Do not exceed 2 doses in 24 hour period).     ubrogepant 100 mg tablet  Commonly known as: UBRELVY  Take 1 tablet (100 mg total) by mouth daily as needed for Migraine.            Indwelling Lines/Drains at time of discharge:   Lines/Drains/Airways     None                 Time spent on the discharge of patient: 35 minutes         Ramona Doshi PA-C  Department of Hospital Medicine  [facility] - Intensive Care ([place])

## 2022-10-31 ENCOUNTER — PES CALL (OUTPATIENT)
Dept: ADMINISTRATIVE | Facility: CLINIC | Age: 32
End: 2022-10-31
Payer: COMMERCIAL

## 2022-10-31 LAB
LEGIONELLA CULTURE STATUS: NORMAL
LEGIONELLA SPEC CULT: NORMAL
SPECIMEN SOURCE: NORMAL

## 2022-11-01 ENCOUNTER — PES CALL (OUTPATIENT)
Dept: ADMINISTRATIVE | Facility: CLINIC | Age: 32
End: 2022-11-01
Payer: COMMERCIAL

## 2022-11-02 ENCOUNTER — OFFICE VISIT (OUTPATIENT)
Dept: INTERNAL MEDICINE | Facility: CLINIC | Age: 32
End: 2022-11-02
Payer: COMMERCIAL

## 2022-11-02 ENCOUNTER — PES CALL (OUTPATIENT)
Dept: ADMINISTRATIVE | Facility: CLINIC | Age: 32
End: 2022-11-02
Payer: COMMERCIAL

## 2022-11-02 VITALS
WEIGHT: 187.81 LBS | BODY MASS INDEX: 29.48 KG/M2 | HEART RATE: 99 BPM | SYSTOLIC BLOOD PRESSURE: 104 MMHG | DIASTOLIC BLOOD PRESSURE: 72 MMHG | HEIGHT: 67 IN | OXYGEN SATURATION: 97 %

## 2022-11-02 DIAGNOSIS — J18.9 PNEUMONIA OF BOTH LOWER LOBES DUE TO INFECTIOUS ORGANISM: ICD-10-CM

## 2022-11-02 DIAGNOSIS — J06.9 VIRAL URI: Primary | ICD-10-CM

## 2022-11-02 PROCEDURE — 99214 PR OFFICE/OUTPT VISIT, EST, LEVL IV, 30-39 MIN: ICD-10-PCS | Mod: S$GLB,,, | Performed by: INTERNAL MEDICINE

## 2022-11-02 PROCEDURE — 99214 OFFICE O/P EST MOD 30 MIN: CPT | Mod: S$GLB,,, | Performed by: INTERNAL MEDICINE

## 2022-11-02 PROCEDURE — 99999 PR PBB SHADOW E&M-EST. PATIENT-LVL III: CPT | Mod: PBBFAC,,, | Performed by: INTERNAL MEDICINE

## 2022-11-02 PROCEDURE — 99999 PR PBB SHADOW E&M-EST. PATIENT-LVL III: ICD-10-PCS | Mod: PBBFAC,,, | Performed by: INTERNAL MEDICINE

## 2022-11-02 RX ORDER — BENZONATATE 200 MG/1
200 CAPSULE ORAL 3 TIMES DAILY PRN
Qty: 21 CAPSULE | Refills: 0 | Status: SHIPPED | OUTPATIENT
Start: 2022-11-02 | End: 2022-11-12

## 2022-11-02 NOTE — PROGRESS NOTES
Assessment:       1. Viral URI    2. Pneumonia of both lower lobes due to infectious organism          Plan:         Germain was seen today for hospital follow up.    Diagnoses and all orders for this visit:    Viral URI  -     benzonatate (TESSALON) 200 MG capsule; Take 1 capsule (200 mg total) by mouth 3 (three) times daily as needed.    Pneumonia of both lower lobes due to infectious organism  -     benzonatate (TESSALON) 200 MG capsule; Take 1 capsule (200 mg total) by mouth 3 (three) times daily as needed.  -     X-Ray Chest PA And Lateral; Standing  Xray in 4-6 weeks  Still recovering from PNA   reviewed signs and symptoms that should prompt return to provider or evaluation in the ED        Subjective:       Patient ID: Germain Parrish is a 32 y.o. male.    Chief Complaint: Hospital Follow Up      Hospital follow up       Hospital Discharge Date: 10/22/2022  Hospital Follow up within two days of discharge: yes      Family and/or Caretaker present at visit?  No.  Diagnostic tests reviewed/disposition: No diagnosic tests pending after this hospitalization.  Disease/illness education: preforemed  Home health/community services discussion/referrals: Patient does not have home health established from hospital visit.  They do not need home health.  If needed, we will set up home health for the patient.   Establishment or re-establishment of referral orders for community resources: No other necessary community resources.   Discussion with other health care providers: No discussion with other health care providers necessary.     Admin/Discharge:   Admission Date: 10/20/2022  Hospital Length of Stay: 2 days  Discharge Date and Time: 10/22/2022  4:14 PM    Reason for admission:   PNA    Brief History:   ough, congestion and shortness of breath with associated intermittent nausea and diarrhea initially on Azithromycin and Augmentin outpatient  but developed night sweats the last 2 days,. Started on IV levo 750 mg and  "vanc and given IV      Pertinent Lab:   WBC 8.84. Lactic 1.9. Na 132. K 2.9. Cr 1.5.  Covid and flu negative.   Blood cx with NGTD x2 days  Respiratory viral panel positive for adenovirus  Component 13 d ago    Legionella Culture Source SPUTUM VC    Legionella Culture Status FINAL    Legionella Culture NOT ISOLATED       Pertinent Imaging;  CXR with abnormal chest radiograph demonstrating airspace consolidation in the mid and lower lung zones on the left, consistent with acute pneumonia    Discharge Plan;   fu pcp     Discharge pertinent meds:  po levofloxacin on discharge    Labs/Imaging pending at the time of discharged: none    Since Discharge: continues to have cough. He stopped tessalon pearles on Monday. No hemoptsis. Had some episodes brief of dizziness this am . He has been trying to keep up hydration but does forget with going back to work an taking care of kids.              HPI    Review of Systems   All other systems reviewed and are negative.          Health Maintenance Due   Topic Date Due    Pneumococcal Vaccines (Age 0-64) (1 - PCV) Never done    COVID-19 Vaccine (3 - Booster for Pfizer series) 06/06/2021    Influenza Vaccine (1) Never done         Objective:     /72 (BP Location: Right arm, Patient Position: Sitting, BP Method: Large (Manual))   Pulse 99   Ht 5' 7" (1.702 m)   Wt 85.2 kg (187 lb 13.3 oz)   SpO2 97%   BMI 29.42 kg/m²     Vitals 11/2/2022 10/20/2022 10/20/2022 2/21/2022 12/29/2021   Height 67 - 67 67 67   Weight (lbs) 187.83 193.78 202 202.16 190.26   BMI (kg/m2) 29.4 30.3 31.6 31.7 29.8                Physical Exam  Vitals and nursing note reviewed.   Constitutional:       General: He is not in acute distress.     Appearance: He is well-developed. He is not ill-appearing, toxic-appearing or diaphoretic.   HENT:      Head: Normocephalic.   Eyes:      Conjunctiva/sclera: Conjunctivae normal.   Cardiovascular:      Rate and Rhythm: Normal rate and regular rhythm.      Heart " sounds: Normal heart sounds. No murmur heard.    No friction rub. No gallop.   Pulmonary:      Effort: Pulmonary effort is normal. No respiratory distress.   Abdominal:      General: There is no distension.      Palpations: Abdomen is soft.   Neurological:      General: No focal deficit present.      Mental Status: He is alert and oriented to person, place, and time.           Future Appointments   Date Time Provider Department Center   12/6/2022  8:15 AM M[facility] XR1 300 LB LIMIT M[facility] XRAY [place]         Medication List with Changes/Refills   New Medications    BENZONATATE (TESSALON) 200 MG CAPSULE    Take 1 capsule (200 mg total) by mouth 3 (three) times daily as needed.   Current Medications    COLCHICINE (COLCRYS) 0.6 MG TABLET    Take 1 tablet (0.6 mg total) by mouth once daily. for 3 days    SERTRALINE (ZOLOFT) 100 MG TABLET    Take 1 tablet (100 mg total) by mouth once daily.    SUMATRIPTAN (IMITREX) 50 MG TABLET    Take 1 tablet (50 mg total) by mouth every 2 (two) hours as needed for Migraine (Do not exceed 2 doses in 24 hour period).    UBROGEPANT (UBRELVY)TABLET 100 MG    Take 1 tablet (100 mg total) by mouth daily as needed for Migraine.         Disclaimer:  This note has been generated using voice-recognition software. There may be grammatical or spelling errors that have been missed during proof-reading

## 2022-12-06 ENCOUNTER — HOSPITAL ENCOUNTER (OUTPATIENT)
Dept: RADIOLOGY | Facility: HOSPITAL | Age: 32
Discharge: HOME | End: 2022-12-06
Attending: INTERNAL MEDICINE
Payer: COMMERCIAL

## 2022-12-06 DIAGNOSIS — J18.9 PNEUMONIA OF BOTH LOWER LOBES DUE TO INFECTIOUS ORGANISM: ICD-10-CM

## 2022-12-06 PROCEDURE — 71046 X-RAY EXAM CHEST 2 VIEWS: CPT | Mod: TC,PO

## 2022-12-06 PROCEDURE — 71046 X-RAY EXAM CHEST 2 VIEWS: CPT | Mod: 26,,, | Performed by: RADIOLOGY

## 2022-12-06 PROCEDURE — 71046 XR CHEST PA AND LATERAL: ICD-10-PCS | Mod: 26,,, | Performed by: RADIOLOGY

## 2022-12-07 ENCOUNTER — PATIENT MESSAGE (OUTPATIENT)
Dept: INTERNAL MEDICINE | Facility: CLINIC | Age: 32
End: 2022-12-07
Payer: COMMERCIAL

## 2022-12-12 ENCOUNTER — TELEPHONE (OUTPATIENT)
Dept: INTERNAL MEDICINE | Facility: CLINIC | Age: 32
End: 2022-12-12
Payer: COMMERCIAL

## 2022-12-12 ENCOUNTER — TELEPHONE (OUTPATIENT)
Dept: INTERNAL MEDICINE | Facility: CLINIC | Age: 32
End: 2022-12-12

## 2022-12-12 NOTE — TELEPHONE ENCOUNTER
----- Message from Lucretia Black sent at 12/12/2022  2:12 PM CST -----  Contact: pt [phone]  Patient had XRAY on 12/06/2022. MD who orders the XRAY will be out for 2 weeks and patient is still coughing and is requesting to speak with you.    Please call and advise.    Thank You       Labs/Medications

## 2022-12-12 NOTE — TELEPHONE ENCOUNTER
inForm patient chest x-ray that he had ordered by Dr. Seals shows that the infiltrate has improved and resolved.

## 2022-12-12 NOTE — TELEPHONE ENCOUNTER
----- Message from Lucretia Black sent at 12/12/2022  2:16 PM CST -----  Contact: pt [phone]  Patient had XRAY on 12/06/2022 ordered by you.    Please have the covering provider call the patient with the results of the XRAY ASAP    Please call and advise.    Thank You

## 2022-12-13 NOTE — TELEPHONE ENCOUNTER
----- Message from Isabell Francois sent at 12/13/2022 10:23 AM CST -----  Contact: MARU GROSS [[id number]]@ [phone]  [email or web address]    Type: Test Results  Chest xray     What test was performed?  Chest Xray    Who ordered the test?  LETI SEALS III    When and where were the test performed?   12/06  [place]    Would you like response via [place]: Call back     Comments: Dr Seals is out of the office for a week so he ask that they send the results to Dr. Edwards. Please call him today with the results.

## 2022-12-13 NOTE — TELEPHONE ENCOUNTER
Pt had chest xray done 12/6. Dr Seals, who ordered the test, is out of the office. Pt asking for PCP to read. States he is still coughing.

## 2022-12-16 NOTE — TELEPHONE ENCOUNTER
Called and spoke to pt. Pt states he is mostly feeling better however definitely not 100%. Pt still c/o chest congestion and white/yellow productive cough. Pt states cough increases while lying down. Pt also concerns about the fibrotic scars on the chest xray. Pt asked if she can speak to PCP. OK to schedule a VV?

## 2022-12-30 ENCOUNTER — OFFICE VISIT (OUTPATIENT)
Dept: INTERNAL MEDICINE | Facility: CLINIC | Age: 32
End: 2022-12-30
Payer: COMMERCIAL

## 2022-12-30 ENCOUNTER — TELEPHONE (OUTPATIENT)
Dept: INTERNAL MEDICINE | Facility: CLINIC | Age: 32
End: 2022-12-30

## 2022-12-30 DIAGNOSIS — R05.3 CHRONIC COUGH: ICD-10-CM

## 2022-12-30 DIAGNOSIS — R93.89 ABNORMAL CXR (CHEST X-RAY): Primary | ICD-10-CM

## 2022-12-30 PROCEDURE — 99214 PR OFFICE/OUTPT VISIT, EST, LEVL IV, 30-39 MIN: ICD-10-PCS | Mod: 95,,, | Performed by: INTERNAL MEDICINE

## 2022-12-30 PROCEDURE — 99214 OFFICE O/P EST MOD 30 MIN: CPT | Mod: 95,,, | Performed by: INTERNAL MEDICINE

## 2022-12-30 NOTE — PROGRESS NOTES
The patient location is: [place]  The chief complaint leading to consultation is:   Visit type: Virtual visit with synchronous audio and video  Total time spent with patient: 15 minutes  Each patient to whom he or she provides medical services by telemedicine is:  (1) informed of the relationship between the physician and patient and the respective role of any other health care provider with respect to management of the patient; and (2) notified that he or she may decline to receive medical services by telemedicine and may withdraw from such care at any time.      CHIEF COMPLAINT     No chief complaint on file.  TELEMEDICINE VISIT    HPI     Germain Parrish is 32 y.o. male here today for   Answers submitted by the patient for this visit:  Cough Questionnaire (Submitted on 12/30/2022)  Chief Complaint: Cough  Chronicity: recurrent  Onset: more than 1 month ago  Progression since onset: gradually improving  Frequency: every few hours  Cough characteristics: productive of sputum  Aggravated by: exercise, lying down, pollens  pneumonia: Yes  Improvement on treatment: mild    Patient was well until October 20, 2022.  Was seen in urgent care diagnosed with pneumonia started on azithromycin and Augmentin.  Patient failed to improve.  Was hospitalized and treated for bacterial pneumonia.  He was discharged on levofloxacin.  Completed treatment.  Patient had repeat chest x-ray 6 weeks later showing some fibrotic streaking but improvement in area of consolidation.      Patient reports persistent cough and some wheezing.  Also having some postnasal drip. No asthma history but atopic hstory    Personally Reviewed Patient's Medical, surgical, family and social hx. Changes updated in [facility].  Care Team updated in Epic    Review of Systems:  Review of Systems   Respiratory:  Positive for cough and wheezing.      Health Maintenance:   Reviewed with patient  Due for the following:      PHYSICAL EXAM       Gen: Well Appearing,  NAD  HEENT: PERR, EOMI  Chest: Normal work of breathing,   Neuro: A&Ox3,  speech normal  Mood; Mood normal, behavior normal, thought process linear       LABS     Labs reviewed; Notable for    CXR:Cardiac size is normal except for a few fibrotic streaks in the left lung base lungs are clear previously noted infiltrate is resolved.       ASSESSMENT     1. Abnormal CXR (chest x-ray)  X-Ray Chest PA And Lateral      2. Chronic cough                  Plan     Germain Parrish is a 32 y.o. male    I am going to repeat chest x-ray to see if changes were just lagging radiographic resolution of infection  Also want to listen the patient's lungs to see if he is wheezing or just coughing from postnasal drip.  If patient is in fact wheezing will get pulmonary function test and consider trial of rescue inhaler.    Due to my schedule I am going to have patient scheduled to resident clinic on January 12th and had the resident Check patient out with me.  Interested to see follow-up regarding his chest x-ray, as well as lung exam.    1. Abnormal CXR (chest x-ray)  - X-Ray Chest PA And Lateral; Future    2. Chronic cough        Ramirez Edwards MD

## 2023-01-11 ENCOUNTER — HOSPITAL ENCOUNTER (OUTPATIENT)
Dept: RADIOLOGY | Facility: HOSPITAL | Age: 33
Discharge: HOME | End: 2023-01-11
Attending: INTERNAL MEDICINE
Payer: COMMERCIAL

## 2023-01-11 DIAGNOSIS — R93.89 ABNORMAL CXR (CHEST X-RAY): ICD-10-CM

## 2023-01-11 PROCEDURE — 71046 X-RAY EXAM CHEST 2 VIEWS: CPT | Mod: TC,FY,PO

## 2023-01-11 PROCEDURE — 71046 X-RAY EXAM CHEST 2 VIEWS: CPT | Mod: 26,,, | Performed by: RADIOLOGY

## 2023-01-11 PROCEDURE — 71046 XR CHEST PA AND LATERAL: ICD-10-PCS | Mod: 26,,, | Performed by: RADIOLOGY

## 2023-01-12 ENCOUNTER — OFFICE VISIT (OUTPATIENT)
Dept: INTERNAL MEDICINE | Facility: CLINIC | Age: 33
End: 2023-01-12
Payer: COMMERCIAL

## 2023-01-12 VITALS
HEIGHT: 67 IN | BODY MASS INDEX: 30.04 KG/M2 | OXYGEN SATURATION: 98 % | HEART RATE: 102 BPM | DIASTOLIC BLOOD PRESSURE: 88 MMHG | WEIGHT: 191.38 LBS | SYSTOLIC BLOOD PRESSURE: 120 MMHG

## 2023-01-12 DIAGNOSIS — R06.02 SHORTNESS OF BREATH: Primary | ICD-10-CM

## 2023-01-12 DIAGNOSIS — R93.89 ABNORMAL CXR (CHEST X-RAY): ICD-10-CM

## 2023-01-12 PROCEDURE — 99214 PR OFFICE/OUTPT VISIT, EST, LEVL IV, 30-39 MIN: ICD-10-PCS | Mod: S$GLB,,,

## 2023-01-12 PROCEDURE — 99999 PR PBB SHADOW E&M-EST. PATIENT-LVL III: ICD-10-PCS | Mod: PBBFAC,,,

## 2023-01-12 PROCEDURE — 99214 OFFICE O/P EST MOD 30 MIN: CPT | Mod: S$GLB,,,

## 2023-01-12 PROCEDURE — 99999 PR PBB SHADOW E&M-EST. PATIENT-LVL III: CPT | Mod: PBBFAC,,,

## 2023-01-12 NOTE — PROGRESS NOTES
Patient examined, record reviewed, agree with findings and recommendations as documented above.    Hospitalized with pneumonia in October of 2022.  Chest x-ray 6 weeks later showed subsegmental band like opacity DEXA atelectasis versus scarring.  Repeat x-ray yesterday showed similar symptoms.    Patient has had persistent cough however I feel it is more related to chronic postnasal drip then abnormal chest x-ray finding.  Lung exam today is normal.  Discussed further evaluation including pulmonary function tests to see if it is affecting his mechanics of breathing verses getting additional imaging to further characterize the abnormal finding.    Shared decision to get PFTS and will decide what do about additional imaging after completing PFTs.    Ramirez Edwards MD

## 2023-01-12 NOTE — PROGRESS NOTES
Clinic Note  01/12/2023      Subjective:           Chief Complaint: Follow-up    Patient ID: Germain Parrish is a 32 y.o. male being seen for an established visit.    Pt is a 32 year old male with PMH of migraines, anxiety recurrent pneumonia that presented as a follow-up for an abnormal chest x-ray. The chest x-ray was thought to show some scaring or chronic atelectasis. Pt states he does feel more SOB since his last hospitalization. Pt feels that he has never gotten back to 100% and playing with his kids for 5 minutes will tire him out. Pt does not get winded at rest or with mild exercise, but feels he gets winded more easily. Pt states he has a chronic cough, decreased lung capacity, fatigued, and has chest congestion. Pt states he never had a history of asthma as a child but thinks he suffered from severe allergies and is currently sick with a cold. Pt does cough up phlegm but it is mainly in the morning and is usually clear. Pt has no occupational exposures and [place]. No pets, but has 3 children.      Review of Systems   Constitutional:  Positive for fatigue.   HENT:  Positive for congestion, postnasal drip and sore throat.    Respiratory:  Positive for cough and shortness of breath.    Cardiovascular:  Negative for chest pain and leg swelling.   Gastrointestinal:  Negative for abdominal pain, constipation and diarrhea.   Genitourinary:  Negative for difficulty urinating and dysuria.   Musculoskeletal:  Positive for back pain. Negative for gait problem.   Skin:  Negative for rash and wound.   Neurological:  Positive for dizziness (post pneumonia). Negative for weakness and light-headedness.   Psychiatric/Behavioral:  Positive for decreased concentration. Negative for confusion.      Patient's Medications   New Prescriptions    No medications on file   Previous Medications    COLCHICINE (COLCRYS) 0.6 MG TABLET    Take 1 tablet (0.6 mg total) by mouth once daily. for 3  "days    SERTRALINE (ZOLOFT) 100 MG TABLET    Take 1 tablet (100 mg total) by mouth once daily.   Modified Medications    No medications on file   Discontinued Medications    SUMATRIPTAN (IMITREX) 50 MG TABLET    Take 1 tablet (50 mg total) by mouth every 2 (two) hours as needed for Migraine (Do not exceed 2 doses in 24 hour period).    UBROGEPANT (UBRELVY)TABLET 100 MG    Take 1 tablet (100 mg total) by mouth daily as needed for Migraine.       Patient Active Problem List    Diagnosis Date Noted    Pneumonia 10/20/2022    Hypokalemia 10/20/2022    Hyponatremia 10/20/2022    Diarrhea 10/20/2022    Migraine      Dx as teen. Reports getting 1-2x month. Sx facial pressure, photo/phonophobia, nauseated.  Episodes last 3-12hrs. Reports can break 50% of time with NSAIDS and caffeine.Triggers lighting, certain food triggers.  No imaging, no eval by neuro             Objective:      /88 (BP Location: Left arm, Patient Position: Sitting, BP Method: Medium (Manual))   Pulse 102   Ht 5' 7" (1.702 m)   Wt 86.8 kg (191 lb 5.8 oz)   SpO2 98%   BMI 29.97 kg/m²   Estimated body mass index is 29.97 kg/m² as calculated from the following:    Height as of this encounter: 5' 7" (1.702 m).    Weight as of this encounter: 86.8 kg (191 lb 5.8 oz).    Physical Exam  Vitals reviewed.   Constitutional:       General: He is not in acute distress.     Appearance: Normal appearance. He is not ill-appearing.   HENT:      Head: Normocephalic and atraumatic.      Right Ear: External ear normal.      Left Ear: External ear normal.      Nose: Congestion and rhinorrhea present.      Mouth/Throat:      Mouth: Mucous membranes are moist.      Pharynx: Posterior oropharyngeal erythema present.   Eyes:      General: No scleral icterus.     Extraocular Movements: Extraocular movements intact.   Cardiovascular:      Rate and Rhythm: Normal rate and regular rhythm.   Pulmonary:      Effort: Pulmonary effort is normal. No respiratory distress.      " Breath sounds: Normal breath sounds. No wheezing or rales.   Abdominal:      General: Abdomen is flat. There is no distension.      Palpations: Abdomen is soft.      Tenderness: There is no abdominal tenderness.   Musculoskeletal:      Right lower leg: No edema.      Left lower leg: No edema.   Skin:     General: Skin is warm and dry.      Findings: No lesion or rash.   Neurological:      General: No focal deficit present.      Mental Status: He is alert and oriented to person, place, and time.   Psychiatric:         Mood and Affect: Mood normal.         Behavior: Behavior normal. Behavior is cooperative.       Assessment & Plan:   Germain was seen today for follow-up.    Diagnoses and all orders for this visit:    Hard to tell from patient's x-ray what is causing his SOB. Most likely somewhat due to deconditioning from hospitalization. Will attempt to correlate the findings seen on x-ray with PFTs and will see if there are any restrictive or obstructive issues. If this shows some abnormality will do further work with a CT scan.     Shortness of breath  -     Complete PFT w/ bronchodilator; Future    Abnormal CXR (chest x-ray)  -     Complete PFT w/ bronchodilator; Future          Patient seen and plan of care discussed with Dr. Edwards     Follow-up with PCP as scheduled    Arturo Mcgovern DO, PGY1  [facility]

## 2023-01-18 ENCOUNTER — HOSPITAL ENCOUNTER (OUTPATIENT)
Dept: PULMONOLOGY | Facility: CLINIC | Age: 33
Discharge: HOME | End: 2023-01-18
Payer: COMMERCIAL

## 2023-01-18 DIAGNOSIS — R93.89 ABNORMAL CXR (CHEST X-RAY): ICD-10-CM

## 2023-01-18 DIAGNOSIS — R06.02 SHORTNESS OF BREATH: ICD-10-CM

## 2023-01-18 PROCEDURE — 94729 DIFFUSING CAPACITY: CPT | Mod: S$GLB,,, | Performed by: INTERNAL MEDICINE

## 2023-01-18 PROCEDURE — 94727 GAS DIL/WSHOT DETER LNG VOL: CPT | Mod: S$GLB,,, | Performed by: INTERNAL MEDICINE

## 2023-01-18 PROCEDURE — 94729 PR C02/MEMBANE DIFFUSE CAPACITY: ICD-10-PCS | Mod: S$GLB,,, | Performed by: INTERNAL MEDICINE

## 2023-01-18 PROCEDURE — 94060 PR EVAL OF BRONCHOSPASM: ICD-10-PCS | Mod: S$GLB,,, | Performed by: INTERNAL MEDICINE

## 2023-01-18 PROCEDURE — 94060 EVALUATION OF WHEEZING: CPT | Mod: S$GLB,,, | Performed by: INTERNAL MEDICINE

## 2023-01-18 PROCEDURE — 94727 PR PULM FUNCTION TEST BY GAS: ICD-10-PCS | Mod: S$GLB,,, | Performed by: INTERNAL MEDICINE

## 2023-01-23 PROBLEM — J18.9 PNEUMONIA: Status: RESOLVED | Noted: 2022-10-20 | Resolved: 2023-01-23

## 2023-08-14 ENCOUNTER — TELEPHONE (OUTPATIENT)
Dept: INTERNAL MEDICINE | Facility: CLINIC | Age: 33
End: 2023-08-14
Payer: MEDICAID

## 2023-08-14 ENCOUNTER — NURSE TRIAGE (OUTPATIENT)
Dept: ADMINISTRATIVE | Facility: CLINIC | Age: 33
End: 2023-08-14
Payer: MEDICAID

## 2023-08-14 NOTE — TELEPHONE ENCOUNTER
Patient states history of kidney stones and current c/o right flank pain radiating to his groin. Patient states c/o severe, excruciating pain on 8/12/23, with diarrhea and vomiting. Patient states pain has subsided at this time and is requesting a prescription for Flomax.     Care Advice given per Flank Pain - Adult Guideline. Patient advised to Go to ED/[facility] for evaluation/treatment. Patient states understanding of care advice and cites no additional concerns at this time.     Reason for Disposition   Pain radiates into groin, scrotum    Additional Information   Negative: Passed out (i.e., lost consciousness, collapsed and was not responding)   Negative: Shock suspected (e.g., cold/pale/clammy skin, too weak to stand, low BP, rapid pulse)   Negative: Sounds like a life-threatening emergency to the triager   Negative: Followed a major injury to the back (e.g., MVA, fall > 10 feet or 3 meters, penetrating injury, etc.)   Negative: Upper, mid or lower back pain that occurs mainly in the midline   Negative: SEVERE pain (e.g., excruciating, scale 8-10) and present > 1 hour   Negative: Sudden onset of severe flank pain and age > 60 years   Negative: Abdominal pain and age > 60 years   Negative: Unable to urinate (or only a few drops) > 4 hours and bladder feels very full (e.g., palpable bladder or strong urge to urinate)    Protocols used: Flank Pain-A-[place]

## 2023-08-14 NOTE — TELEPHONE ENCOUNTER
----- Message from Lucretia Black sent at 8/14/2023  9:23 AM CDT -----  Contact: pt [phone]  Prescription Request:     Name of medication: tamsulosin (FLOMAX) 0.4 mg Cap    Reason for request: kidney stones    Pharmacy:   [facility]/pharmacy #[id number] - [place] - [address]  [address]  [place] [address]  Phone: [phone] Fax: [phone]    Please contact the patient with the status of this request.    Thank You

## 2023-08-14 NOTE — TELEPHONE ENCOUNTER
Called and spoke to pt. Pt states he started with right flank pain 7/10 Saturday. +vomiting Pt states he had difficulty urinating on Saturday and states that urine had an odor. Pt states the pain stayed all Saturday and Sunday. Pt states now the pain comes and goes. Pt denies hematuria and fever. Pt states he spoke with an on call nurse and she told him that she thought he had a kidney stone. Pt states that he has suffered from kidney stones in the past and requesting PCP call in Flomax. Explained that PCP would want pt to come in for an eval. Pt abruptly hung up stating he had an unexpected leak in the house and to please send a message to PCP requesting Flomax. Please advise.

## 2023-08-15 NOTE — TELEPHONE ENCOUNTER
Called pt to discuss note from PCP, no answer. Left message for pt to return call. Portal message also sent.

## 2023-11-16 ENCOUNTER — OFFICE VISIT (OUTPATIENT)
Dept: SPORTS MEDICINE | Facility: CLINIC | Age: 33
End: 2023-11-16
Payer: COMMERCIAL

## 2023-11-16 ENCOUNTER — OFFICE VISIT (OUTPATIENT)
Dept: INTERNAL MEDICINE | Facility: CLINIC | Age: 33
End: 2023-11-16
Payer: COMMERCIAL

## 2023-11-16 ENCOUNTER — HOSPITAL ENCOUNTER (OUTPATIENT)
Dept: RADIOLOGY | Facility: HOSPITAL | Age: 33
Discharge: HOME | End: 2023-11-16
Attending: NURSE PRACTITIONER
Payer: COMMERCIAL

## 2023-11-16 ENCOUNTER — HOSPITAL ENCOUNTER (OUTPATIENT)
Dept: RADIOLOGY | Facility: HOSPITAL | Age: 33
Discharge: HOME | End: 2023-11-16
Attending: ORTHOPAEDIC SURGERY
Payer: COMMERCIAL

## 2023-11-16 VITALS
HEART RATE: 89 BPM | BODY MASS INDEX: 30.13 KG/M2 | WEIGHT: 192 LBS | DIASTOLIC BLOOD PRESSURE: 91 MMHG | HEIGHT: 67 IN | SYSTOLIC BLOOD PRESSURE: 129 MMHG

## 2023-11-16 VITALS
WEIGHT: 192.25 LBS | HEART RATE: 84 BPM | HEIGHT: 67 IN | OXYGEN SATURATION: 98 % | BODY MASS INDEX: 30.17 KG/M2 | DIASTOLIC BLOOD PRESSURE: 82 MMHG | SYSTOLIC BLOOD PRESSURE: 122 MMHG

## 2023-11-16 DIAGNOSIS — M79.671 RIGHT FOOT PAIN: ICD-10-CM

## 2023-11-16 DIAGNOSIS — M75.51 BURSITIS OF RIGHT SHOULDER: ICD-10-CM

## 2023-11-16 DIAGNOSIS — S76.301A RIGHT HAMSTRING INJURY, INITIAL ENCOUNTER: ICD-10-CM

## 2023-11-16 DIAGNOSIS — S76.301A RIGHT HAMSTRING INJURY, INITIAL ENCOUNTER: Primary | ICD-10-CM

## 2023-11-16 DIAGNOSIS — S96.911A STRAIN OF RIGHT FOOT, INITIAL ENCOUNTER: ICD-10-CM

## 2023-11-16 PROCEDURE — 73630 X-RAY EXAM OF FOOT: CPT | Mod: 26,RT,, | Performed by: RADIOLOGY

## 2023-11-16 PROCEDURE — 3074F PR MOST RECENT SYSTOLIC BLOOD PRESSURE < 130 MM HG: ICD-10-PCS | Mod: CPTII,S$GLB,, | Performed by: NURSE PRACTITIONER

## 2023-11-16 PROCEDURE — 3079F PR MOST RECENT DIASTOLIC BLOOD PRESSURE 80-89 MM HG: ICD-10-PCS | Mod: CPTII,S$GLB,, | Performed by: NURSE PRACTITIONER

## 2023-11-16 PROCEDURE — 1159F PR MEDICATION LIST DOCUMENTED IN MEDICAL RECORD: ICD-10-PCS | Mod: CPTII,S$GLB,, | Performed by: NURSE PRACTITIONER

## 2023-11-16 PROCEDURE — 73564 X-RAY EXAM KNEE 4 OR MORE: CPT | Mod: 26,50,, | Performed by: RADIOLOGY

## 2023-11-16 PROCEDURE — 3008F PR BODY MASS INDEX (BMI) DOCUMENTED: ICD-10-PCS | Mod: CPTII,S$GLB,, | Performed by: NURSE PRACTITIONER

## 2023-11-16 PROCEDURE — 1159F MED LIST DOCD IN RCRD: CPT | Mod: CPTII,S$GLB,, | Performed by: NURSE PRACTITIONER

## 2023-11-16 PROCEDURE — 99999 PR PBB SHADOW E&M-EST. PATIENT-LVL IV: CPT | Mod: PBBFAC,,, | Performed by: ORTHOPAEDIC SURGERY

## 2023-11-16 PROCEDURE — 3079F DIAST BP 80-89 MM HG: CPT | Mod: CPTII,S$GLB,, | Performed by: NURSE PRACTITIONER

## 2023-11-16 PROCEDURE — 99213 OFFICE O/P EST LOW 20 MIN: CPT | Mod: S$GLB,,, | Performed by: NURSE PRACTITIONER

## 2023-11-16 PROCEDURE — 3080F DIAST BP >= 90 MM HG: CPT | Mod: CPTII,S$GLB,, | Performed by: ORTHOPAEDIC SURGERY

## 2023-11-16 PROCEDURE — 73564 XR KNEE ORTHO BILAT WITH FLEXION: ICD-10-PCS | Mod: 26,50,, | Performed by: RADIOLOGY

## 2023-11-16 PROCEDURE — 3074F SYST BP LT 130 MM HG: CPT | Mod: CPTII,S$GLB,, | Performed by: ORTHOPAEDIC SURGERY

## 2023-11-16 PROCEDURE — 99999 PR PBB SHADOW E&M-EST. PATIENT-LVL IV: CPT | Mod: PBBFAC,,, | Performed by: NURSE PRACTITIONER

## 2023-11-16 PROCEDURE — 1160F RVW MEDS BY RX/DR IN RCRD: CPT | Mod: CPTII,S$GLB,, | Performed by: ORTHOPAEDIC SURGERY

## 2023-11-16 PROCEDURE — 3074F SYST BP LT 130 MM HG: CPT | Mod: CPTII,S$GLB,, | Performed by: NURSE PRACTITIONER

## 2023-11-16 PROCEDURE — 73564 X-RAY EXAM KNEE 4 OR MORE: CPT | Mod: TC,50

## 2023-11-16 PROCEDURE — 99204 OFFICE O/P NEW MOD 45 MIN: CPT | Mod: S$GLB,,, | Performed by: ORTHOPAEDIC SURGERY

## 2023-11-16 PROCEDURE — 99999 PR PBB SHADOW E&M-EST. PATIENT-LVL IV: ICD-10-PCS | Mod: PBBFAC,,, | Performed by: NURSE PRACTITIONER

## 2023-11-16 PROCEDURE — 3008F BODY MASS INDEX DOCD: CPT | Mod: CPTII,S$GLB,, | Performed by: NURSE PRACTITIONER

## 2023-11-16 PROCEDURE — 73630 XR FOOT COMPLETE 3 VIEW RIGHT: ICD-10-PCS | Mod: 26,RT,, | Performed by: RADIOLOGY

## 2023-11-16 PROCEDURE — 1159F MED LIST DOCD IN RCRD: CPT | Mod: CPTII,S$GLB,, | Performed by: ORTHOPAEDIC SURGERY

## 2023-11-16 PROCEDURE — 3008F BODY MASS INDEX DOCD: CPT | Mod: CPTII,S$GLB,, | Performed by: ORTHOPAEDIC SURGERY

## 2023-11-16 PROCEDURE — 99213 PR OFFICE/OUTPT VISIT, EST, LEVL III, 20-29 MIN: ICD-10-PCS | Mod: S$GLB,,, | Performed by: NURSE PRACTITIONER

## 2023-11-16 PROCEDURE — 73630 X-RAY EXAM OF FOOT: CPT | Mod: TC,RT

## 2023-11-16 RX ORDER — MELOXICAM 15 MG/1
15 TABLET ORAL DAILY PRN
Qty: 30 TABLET | Refills: 0 | Status: SHIPPED | OUTPATIENT
Start: 2023-11-16

## 2023-11-16 RX ORDER — METHYLPREDNISOLONE 4 MG/1
TABLET ORAL
Qty: 21 EACH | Refills: 0 | Status: SHIPPED | OUTPATIENT
Start: 2023-11-16 | End: 2023-12-07

## 2023-11-16 RX ORDER — TIZANIDINE 4 MG/1
4 TABLET ORAL NIGHTLY PRN
Qty: 30 TABLET | Refills: 0 | Status: SHIPPED | OUTPATIENT
Start: 2023-11-16 | End: 2023-11-26

## 2023-11-16 NOTE — PROGRESS NOTES
Subjective:     Chief Complaint: Germain Parrish is a 33 y.o. male who had concerns including Pain of the Right Femur.    HPI    Patient presents to clinic with right posterior hip pain x several weeks. Patient believes the pain is due to a pulled hamstring that occurred sometime ago.  He states he felt a pop.  There was no bruising or significant skin changes.  He denies any ANTON or traumatic event. He rates the pain as 1/10 today. He has attempted multiple conservative measures that include activity modification, ice & elevation, and anti-inflammatories, with little relief. He denies any mechanical symptoms to include locking and catching or instability.  Is affecting ADLs and limiting desired level of activity. Denies numbness, tingling, radiation, and inability to bear weight. He is here today to discuss treatment options.    No previous surgeries or trauma on right hip    Review of Systems   Constitutional: Negative.   HENT: Negative.     Eyes: Negative.    Cardiovascular: Negative.    Respiratory: Negative.     Endocrine: Negative.    Hematologic/Lymphatic: Negative.    Skin: Negative.    Musculoskeletal:  Positive for muscle weakness and myalgias. Negative for joint pain, joint swelling and stiffness.   Neurological: Negative.    Psychiatric/Behavioral: Negative.     Allergic/Immunologic: Negative.        Pain Related Questions  Over the past 3 days, what was your average pain during activity? (I.e. running, jogging, walking, climbing stairs, getting dressed, ect.): 6  Over the past 3 days, what was your highest pain level?: 8  Over the past 3 days, what was your lowest pain level? : 1    Other  How many nights a week are you awakened by your affected body part?: 0    Objective:     General: Germain is well-developed, well-nourished, appears stated age, in no acute distress, alert and oriented to time, place and person.     General    Nursing note and vitals reviewed.  Constitutional: He is oriented to  person, place, and time. He appears well-developed and well-nourished. No distress.   HENT:   Head: Normocephalic and atraumatic.   Nose: Nose normal.   Eyes: EOM are normal.   Cardiovascular:  Intact distal pulses.            Pulmonary/Chest: Effort normal. No respiratory distress.   Neurological: He is alert and oriented to person, place, and time.   Psychiatric: He has a normal mood and affect. His behavior is normal. Judgment and thought content normal.           Right Knee Exam     Inspection   Alignment:  normal  Effusion: absent    Left Knee Exam     Inspection   Alignment:  normal  Effusion: absent    Right Hip Exam     Inspection   Scars: absent  Swelling: absent  Bruising: absent  No deformity of hip.  Quadriceps Atrophy:  Negative  Erythema: absent    Range of Motion   Abduction:  45   Adduction:  30   Extension:  20   Flexion:  120   External rotation:  80   Internal rotation:  30     Tests   Pain w/ forced internal rotation (RUMA): absent  Pain w/ forced external rotation (FADIR): absent  Harjeet: negative  Trendelenburg Test: negative  Circumduction test: negative  Stinchfield test: negative  Log Roll: negative    Other   Sensation: normal  Left Hip Exam     Inspection   Scars: absent  Swelling: absent  No deformity of hip.  Quadriceps Atrophy:  negative  Erythema: absent  Bruising: absent    Range of Motion   Abduction:  45 normal   Adduction:  30 normal   Extension:  20 normal   Flexion:  120 normal   External rotation:  70 normal   Internal rotation: 30 normal     Tests   Pain w/ forced internal rotation (RUMA): absent  Pain w/ forced external rotation (FADIR): absent  Harjeet: negative  Trendelenburg Test: negative  Circumduction test: negative  Stinchfield test: negative  Log Roll: negative    Other   Sensation: normal          Muscle Strength   Right Lower Extremity   Hip Abduction: 5/5   Hip Adduction: 5/5   Hip Flexion: 5/5   Ankle Dorsiflexion:  5/5   Left Lower Extremity   Hip Abduction: 5/5    Hip Adduction: 5/5   Hip Flexion: 5/5   Ankle Dorsiflexion:  5/5     Vascular Exam     Right Pulses  Dorsalis Pedis:      2+  Posterior Tibial:      2+        Left Pulses  Dorsalis Pedis:      2+  Posterior Tibial:      2+        Capillary Refill  Right Hand: normal capillary refill  Left Hand: normal capillary refill        Edema  Right Upper Leg: absent  Left Upper Leg: absent    Radiographs bilateral knee    My interpretation:    No signs of fracture, contusion, swelling, DJD, or any other bony abnormalities noted.        Assessment:     Encounter Diagnoses   Name Primary?    Right hamstring injury, initial encounter     Strain of right foot, initial encounter     Bursitis of right shoulder         Plan:     1. I made the decision to order new imaging of the extremity or extremities evaluated. I independently reviewed and interpreted the radiographs and/or MRIs today. These images were shown to the patient where I then discussed my findings in detail.    2. We discussed at length different treatment options including conservative vs surgical management. These include anti-inflammatories, acetaminophen, rest, ice, heat, formal physical therapy including strengthening and stretching exercises, home exercise programs, dry needling, and finally surgical intervention.  We discussed the multiple causes of his lower leg pain to include distal hamstring strain.  Explained that the likelihood of a significant tear is very low, however we could obtain an MRI to determine the integrity of the structures, for which she declined.  We will begin with a course of conservative management to include formal physical therapy.      3. Ambulatory referral for formal physical therapy at [facility].    4. Regarding the pain in his foot, we discussed re-evaluation following completion of physical therapy for a hamstring pain.  Patient states he would prefer to see a foot and ankle specialist prior to completion of physical  therapy.  I explained we can reach out to get him an appointment.    5. RTC to see Matthew Rushing PA-C in 8 weeks for follow-up.  Will reassess hamstring pain and determine if an MRI is warranted at that time.      All of the patient's questions were answered. Patient was advised to call the clinic or contact me through the patient portal for any questions or concerns.       Medical Dictation software was used during the dictation of portions or the entirety of this medical record.  Phonetic or grammatic errors may exist due to the use of this software. For clarification, refer to the author of the document.        Patient questionnaires may have been collected.

## 2023-11-29 ENCOUNTER — TELEPHONE (OUTPATIENT)
Dept: SPORTS MEDICINE | Facility: CLINIC | Age: 33
End: 2023-11-29
Payer: COMMERCIAL

## 2023-12-06 ENCOUNTER — TELEPHONE (OUTPATIENT)
Dept: SPORTS MEDICINE | Facility: CLINIC | Age: 33
End: 2023-12-06
Payer: COMMERCIAL

## 2024-06-10 ENCOUNTER — PATIENT MESSAGE (OUTPATIENT)
Dept: INTERNAL MEDICINE | Facility: CLINIC | Age: 34
End: 2024-06-10
Payer: COMMERCIAL

## 2025-05-05 ENCOUNTER — TELEPHONE (OUTPATIENT)
Dept: PODIATRY | Facility: CLINIC | Age: 35
End: 2025-05-05
Payer: COMMERCIAL

## 2025-05-05 ENCOUNTER — PATIENT MESSAGE (OUTPATIENT)
Dept: PODIATRY | Facility: CLINIC | Age: 35
End: 2025-05-05
Payer: COMMERCIAL

## 2025-05-05 DIAGNOSIS — M79.672 LEFT FOOT PAIN: Primary | ICD-10-CM

## 2025-05-05 NOTE — TELEPHONE ENCOUNTER
Left vm for pt with callback number of [phone] in regards to xray orders for Thursday. Sent portal message.

## 2025-05-05 NOTE — TELEPHONE ENCOUNTER
Call patient and spoke with patient ans he stated that it is his left foot. I Forward message over to erin to advise with further scheduling details.

## 2025-05-05 NOTE — TELEPHONE ENCOUNTER
----- Message from Kady Arvizu DPM sent at 5/5/2025 12:18 PM CDT -----  Yes patient specify left or right so I can place orders  ----- Message -----  From: Wendy Alston MA  Sent: 5/5/2025  11:38 AM CDT  To: Kady Arvizu DPM    Appt Thursday. Xray for pt? Thinks he broke something playing baseball.....

## 2025-05-05 NOTE — TELEPHONE ENCOUNTER
----- Message from Med Assistant Sherry sent at 5/5/2025  2:54 PM CDT -----  Regarding: FW: Returning a Missed Call  Contact: [phone]  Primitivo khan can you advise patient, he stated it is his left foot.  ----- Message -----  From: Cynthia Wright  Sent: 5/5/2025   2:28 PM CDT  To: Nolberto [facility] Staff  Subject: Returning a Missed Call                          Returning a Missed CallCaller:PtReturning call to: Wendy Alston MACaller can be reached @: [phone] of the call:  xray orders for Thursday. Its pt's left footPlease Call to Advise,Thank you.  ----- Message -----  From: Cynthia Wright  Sent: 5/5/2025   2:26 PM CDT  To: Nolberto [facility] Staff  Subject: Returning a Missed Call                          Returning a Missed CallCaller:PtReturning call to: Wendy Alston MACaller can be reached @: [phone] of the call:  xray orders for Thursday.Please Call to Advise,Thank you.

## 2025-05-08 ENCOUNTER — OFFICE VISIT (OUTPATIENT)
Dept: PODIATRY | Facility: CLINIC | Age: 35
End: 2025-05-08
Payer: COMMERCIAL

## 2025-05-08 ENCOUNTER — HOSPITAL ENCOUNTER (OUTPATIENT)
Dept: RADIOLOGY | Facility: HOSPITAL | Age: 35
Discharge: HOME | End: 2025-05-08
Attending: PODIATRIST
Payer: COMMERCIAL

## 2025-05-08 VITALS
HEART RATE: 101 BPM | SYSTOLIC BLOOD PRESSURE: 133 MMHG | BODY MASS INDEX: 30.86 KG/M2 | DIASTOLIC BLOOD PRESSURE: 92 MMHG | HEIGHT: 67 IN | WEIGHT: 196.63 LBS

## 2025-05-08 DIAGNOSIS — M79.672 ACUTE PAIN OF LEFT FOOT: ICD-10-CM

## 2025-05-08 DIAGNOSIS — M79.672 LEFT FOOT PAIN: ICD-10-CM

## 2025-05-08 DIAGNOSIS — Z87.39 H/O: GOUT: ICD-10-CM

## 2025-05-08 DIAGNOSIS — M79.672 LEFT FOOT PAIN: Primary | ICD-10-CM

## 2025-05-08 PROCEDURE — 99999 PR PBB SHADOW E&M-EST. PATIENT-LVL III: CPT | Mod: PBBFAC,,, | Performed by: PODIATRIST

## 2025-05-08 PROCEDURE — 99204 OFFICE O/P NEW MOD 45 MIN: CPT | Mod: S$GLB,,, | Performed by: PODIATRIST

## 2025-05-08 PROCEDURE — 3008F BODY MASS INDEX DOCD: CPT | Mod: CPTII,S$GLB,, | Performed by: PODIATRIST

## 2025-05-08 PROCEDURE — 73630 X-RAY EXAM OF FOOT: CPT | Mod: 26,LT,, | Performed by: RADIOLOGY

## 2025-05-08 PROCEDURE — 3075F SYST BP GE 130 - 139MM HG: CPT | Mod: CPTII,S$GLB,, | Performed by: PODIATRIST

## 2025-05-08 PROCEDURE — 1160F RVW MEDS BY RX/DR IN RCRD: CPT | Mod: CPTII,S$GLB,, | Performed by: PODIATRIST

## 2025-05-08 PROCEDURE — 3080F DIAST BP >= 90 MM HG: CPT | Mod: CPTII,S$GLB,, | Performed by: PODIATRIST

## 2025-05-08 PROCEDURE — 1159F MED LIST DOCD IN RCRD: CPT | Mod: CPTII,S$GLB,, | Performed by: PODIATRIST

## 2025-05-08 PROCEDURE — 73630 X-RAY EXAM OF FOOT: CPT | Mod: TC,PN,LT

## 2025-05-08 RX ORDER — METHYLPREDNISOLONE 4 MG/1
TABLET ORAL
Qty: 1 EACH | Refills: 0 | Status: SHIPPED | OUTPATIENT
Start: 2025-05-08

## 2025-05-08 NOTE — PROGRESS NOTES
Subjective:      Patient ID: Germain Parrish is a 35 y.o. male.    Chief Complaint:   Foot Pain (Left, injured 4/5, wore a boot for about three weeks with some relief)    Germain is a 35 y.o. male who presents to the podiatry clinic  with complaint of  left foot pain.    Patient relates that he had a collision during his softball game while he was running to 1st base and he fell down.  He relates that his foot was really painful and had some swelling  So then turned red and had bruises over the next couple days this was about 3 weeks ago  He did wear a boot for 3 weeks which helped..  Did some traveling for work flew to [place].  He relates he is about 90% better now with regards to pain however the swelling is still present..  Also hurts when he extends the big toe back    Wearing new balance today they feel comfortable  Tried taking Tylenol Advil and icing  He would like to make sure it is not broken and also if he can resume his activities    Upon questioning patient does relate he had gout in his right big toe about 3 years ago  Relates that pain felt different    Lab Results   Component Value Date    URICACID 9.6 (H) 02/21/2022          Past Medical History:   Diagnosis Date    Ankle sprain     Anxiety     Depression     Migraine      Past Surgical History:   Procedure Laterality Date    CYST REMOVAL       Medications Ordered Prior to Encounter[1]  Review of patient's allergies indicates:   Allergen Reactions    Lactose        Review of Systems   Constitutional: Negative for chills, decreased appetite, fever, malaise/fatigue, night sweats, weight gain and weight loss.   Cardiovascular:  Negative for chest pain, claudication, dyspnea on exertion, leg swelling, palpitations and syncope.   Respiratory:  Negative for cough and shortness of breath.    Endocrine: Negative for cold intolerance and heat intolerance.   Hematologic/Lymphatic: Negative for bleeding problem. Does not bruise/bleed easily.   Skin:  Negative  "for color change, dry skin, flushing, itching, nail changes, poor wound healing, rash, skin cancer, suspicious lesions and unusual hair distribution.   Musculoskeletal:  Positive for gout and joint pain. Negative for arthritis, back pain, falls, joint swelling, muscle cramps, muscle weakness, myalgias, neck pain and stiffness.   Gastrointestinal:  Negative for diarrhea, nausea and vomiting.   Neurological:  Negative for dizziness, focal weakness, light-headedness, numbness, paresthesias, tremors, vertigo and weakness.   Psychiatric/Behavioral:  Negative for altered mental status and depression. The patient does not have insomnia.    Allergic/Immunologic: Negative.            Objective:       Vitals:    25 1345   BP: (!) 133/92   Pulse: 101   Weight: 89.2 kg (196 lb 10.4 oz)   Height: 5' 7" (1.702 m)   PainSc:   2   PainLoc: Foot   89.2 kg (196 lb 10.4 oz)     Physical Exam  Vitals reviewed.   Constitutional:       General: He is not in acute distress.     Appearance: He is well-developed. He is not ill-appearing, toxic-appearing or diaphoretic.      Comments: Proper supportive shoegear      Cardiovascular:      Pulses:           Dorsalis pedis pulses are 2+ on the right side and 2+ on the left side.        Posterior tibial pulses are 2+ on the right side and 2+ on the left side.   Musculoskeletal:         General: No swelling or deformity.      Right lower le+ Edema (First metatarsophalangeal joint) present.      Left lower leg: No edema.      Right ankle: Normal.      Right Achilles Tendon: Normal.      Left ankle: Normal.      Left Achilles Tendon: Normal.      Right foot: Decreased range of motion. Tenderness and bony tenderness present. No deformity.      Left foot: Decreased range of motion. No deformity, tenderness or bony tenderness.      Comments: No sesamoid pain left  Positive pain on palpation to 1st metatarsophalangeal joint medial and lateral  No pain to other areas of metatarsals are digits " no pain distally to hallux    Negative vibratory pain    No pain right       Feet:      Right foot:      Protective Sensation: 10 sites tested.  10 sites sensed.      Skin integrity: No ulcer, blister, skin breakdown, erythema, warmth, callus or dry skin.      Toenail Condition: Right toenails are normal.      Left foot:      Protective Sensation: 10 sites tested.  10 sites sensed.      Skin integrity: No ulcer, blister, skin breakdown, erythema, warmth, callus or dry skin.      Toenail Condition: Left toenails are normal.      Comments: Sensation intact    No open lesions no excessive warmth    Skin:     General: Skin is warm.      Capillary Refill: Capillary refill takes 2 to 3 seconds.      Coloration: Skin is not pale.      Findings: No erythema or rash.      Nails: There is no clubbing.   Neurological:      Mental Status: He is alert and oriented to person, place, and time.      Sensory: No sensory deficit.      Gait: Gait is intact.   Psychiatric:         Attention and Perception: Attention normal.         Mood and Affect: Mood normal.         Speech: Speech normal.         Behavior: Behavior normal.         Thought Content: Thought content normal.         Cognition and Memory: Cognition normal.         Judgment: Judgment normal.       X-Ray Foot Complete Left  Narrative: EXAMINATION:  XR FOOT COMPLETE 3 VIEW LEFT    CLINICAL HISTORY:  left foot pain; Pain in left foot    TECHNIQUE:  XR FOOT COMPLETE 3 VIEW LEFT    COMPARISON:  11/16/2023    FINDINGS:  No bone, joint, or soft tissue abnormality is seen.  Impression: See above    Electronically signed by: Rusty Holt Jr  Date:    05/08/2025  Time:    13:36           Assessment:       Encounter Diagnoses   Name Primary?    Left foot pain Yes    Acute pain of left foot     H/O: gout          Plan:       Germain was seen today for foot pain.    Diagnoses and all orders for this visit:    Left foot pain  -     Uric acid; Future    Acute pain of left foot  -      Uric acid; Future    H/O: gout  -     Uric acid; Future    Other orders  -     methylPREDNISolone (MEDROL DOSEPACK) 4 mg tablet; use as directed      I counseled the patient on his conditions, their implications and medical management.    Differential diagnosis sprain, or injury with gout attack    X-rays negative for fracture    No restrictions activity to tolerance    Patient has proper shoe gear and cleats    Recommend Medrol Dosepak and uric acid      Medrol dose merari prescribed, advised patient to take meds as directed. Patient should complete medication. If problems occur notify the clinic     Consider MRI if pain does not resolve in 6-8 weeks     prn         [1]   Current Outpatient Medications on File Prior to Visit   Medication Sig Dispense Refill    sertraline (ZOLOFT) 100 MG tablet Take 1 tablet (100 mg total) by mouth once daily. 90 tablet 3    [DISCONTINUED] colchicine (COLCRYS) 0.6 mg tablet Take 1 tablet (0.6 mg total) by mouth once daily. for 3 days (Patient not taking: Reported on 11/16/2023) 3 tablet 0    [DISCONTINUED] meloxicam (MOBIC) 15 MG tablet Take 1 tablet (15 mg total) by mouth daily as needed for Pain. (Patient not taking: Reported on 5/8/2025) 30 tablet 0     No current facility-administered medications on file prior to visit.

## 2025-06-09 ENCOUNTER — PATIENT MESSAGE (OUTPATIENT)
Dept: PODIATRY | Facility: CLINIC | Age: 35
End: 2025-06-09
Payer: COMMERCIAL